# Patient Record
Sex: MALE | URBAN - METROPOLITAN AREA
[De-identification: names, ages, dates, MRNs, and addresses within clinical notes are randomized per-mention and may not be internally consistent; named-entity substitution may affect disease eponyms.]

---

## 2023-03-01 NOTE — PROGRESS NOTES
HISTORY OF PRESENT ILLNESS  Carlos Otero is a 63 y.o. male. HPI  Pt is here to establish care. This is an established visit completed with telemedicine was completed with video assist. The patient acknowledges and agrees to this method of visitation. Presents w/ his daughter, who helps presents the hx patient is very hard of hearing has hearing aids  Ambulates w/ a walker has poor balance difficulty walking in general has had some falls    NOTE: His  is 1950 (he is age 73)  (This was originally incorrectly entered as 1960)  He is on Medicare    His daughter states pt has constantly felt lightheaded w/ a spinning sensation when he stands up  x 1 year since his colorectal surgery. Reports his last PCP recommended PT but he did not complete this. No other details into his symptoms no records to review  Ordered home health PT. will need to get some basic labs and will have him come into the clinic to further evaluate this    Pt reports R jaw pain after chewing something hard. States it appeared once day when he heard a \"click\" while he was chewing.   It was then hard to open his jaw getting better but still hurts  Recommended warm compresses and seeing a dentist.  This is TMJ      PMHx: Colon cancer restless legs and GERD    No home BP readings for review    Wt per pt is     Ordered labs, pt will come into clinic for labs and BP check  Daughter states last year's labs were nl    He sees Dr. Maxwell Castillo (colorectal surg at [facility] in [place]) for hx of stage 1 colon cancer   Had bowel resection for colon cancer 21 and 22   Did not receive chemotherapy  Last there 10/21/22   Will f/U next 23  Pt's daughter reports pt has had frequent BM's and dizziness (see above) since surgery    He also sees Dr. Nimo Avina (GI) in [address] [place] [id number] 22    Has a hx of back pain from arthritis  He sees Dr. Stephani Devine (ortho spine) at the [address] [place] [address] there 5/18/22, had 2 injections   No surgery   Pt denies back pain radiation down legs    He sees Dr. Jose Eagle (ENT) for his hearing  Pt wears hearing aids     He takes Protonix 40 mg for GERD   This is helpful 3/23    He takes requip for RLS   States this works well 3/23     He takes vit C, fish oil, omega-3, vit D      Pt lives alone, his daughter stays with him sometimes  Grab bars in the shower  Ambulates w/ a walker   Pt's daughter reports some falls, he feels like the room is spinning      Pt is functionally dependent   His daughter helps w/ laundry  His daughter helps w/ driving, he stopped since surgery  His daughter helps w/ bills and meds -- notes he has some memory concerns, he also did not graduate high school     Some memory concerns  Knows the month, date, location   Does not know the year   Can recall 3/3 objects     Discussed he could get a memory evaluation if concerned about this. He declines. FMHx:  No known fmhx of cancer    PSHx:  Robotic colorectal polyp removed by Dr. Liam Davis (colorectal surg) in 2021 and 2022     SHx:  Retired. Not , 3 children. Never smoker, drinks 3 small glasses of wine/day. Discussed guidelines of up to 1 drink/day. He has been on Medicare Part B since 2017 (age 74). ACP not on file. SDM is his daughter. PREVENTIVE:  Colonoscopy: Dr. Liam Davis, will get report  Tdap: due  [id number]: some sort of pneumonia shot 7/25/22, pt will verify which, discussed due if this was not it   Shingrix: due, discussed can get a local pharmacy   Flu shot: Fall 2022   Eye exam: due  Lipids: ordered   Covid: 4 doses Moderna    There are no problems to display for this patient. No past surgical history on file.    No results found for: WBC, WBCT, WBCPOC, HGB, HGBPOC, HCT, HCTPOC, PLT, PLTPOC, MCV, MCVPOC, HGBEXT, HCTEXT, PLTEXT  No results found for: CHOL, CHOLPOCT, HDL, LDL, LDLC, LDLCPOC, LDLCEXT, TRIGL, TGLPOCT, CHHD, CHHDX  No results found for: GFRNA, GFRNAPOC, GFRAA, GFRAAPOC, CREA, CREAPOC, BUN, IBUN, BUNPOC, NA, NAPOC, K, KPOCT, CL, CLPOC, CO2, CO2POC, MG, PHOS, ALBEU, PTH, PTHILT, EPO     Review of Systems   Constitutional:  Negative for chills and fever. HENT:  Negative for hearing loss and tinnitus. Eyes:  Negative for blurred vision and double vision. Respiratory:  Negative for shortness of breath and wheezing. Cardiovascular:  Negative for chest pain and palpitations. Gastrointestinal:  Negative for nausea and vomiting. Genitourinary:  Negative for dysuria and frequency. Musculoskeletal:  Negative for back pain, falls and joint pain. Skin:  Negative for itching and rash. Neurological:  Positive for dizziness. Negative for loss of consciousness and headaches. Psychiatric/Behavioral:  Negative for depression. The patient is not nervous/anxious. Physical Exam  Constitutional:       General: He is not in acute distress. Appearance: Normal appearance. He is not ill-appearing, toxic-appearing or diaphoretic. HENT:      Head: Normocephalic and atraumatic. Eyes:      General:         Right eye: No discharge. Left eye: No discharge. Musculoskeletal:      Cervical back: Normal.   Neurological:      General: No focal deficit present. Mental Status: He is oriented to person, place, and time.    Psychiatric:         Mood and Affect: Mood normal.         Behavior: Behavior normal.       ASSESSMENT and PLAN    ICD-10-CM ICD-9-CM    1. Dizziness  R42 780.4 HEPATITIS C AB      LIPID PANEL      METABOLIC PANEL, COMPREHENSIVE      HEMOGLOBIN A1C WITH EAG      TSH 3RD GENERATION   Patient reports dizziness for 1 year    Provides no additional history    Saw his old PCP and physical therapy was started he also states that he was told that he had positional vertigo    He has orthostatic symptoms and spinning symptoms    We will start with physical therapy we will get basic labs to further assess this we will need him to come into the clinic he states he will come in on Monday to get his blood pressure check    Hypotension could be playing a role    May need to see ENT for Epley maneuver once I have a blood pressure and basic labs we will make a better determination of what is going on asked him to come to clinic today but he cannot    We will schedule close follow-up    Of note this is not new this has been going on for 1 year we will get old records to review as well   CBC W/O DIFF      PSA SCREENING (SCREENING)      REFERRAL TO Curt Gordillo      2. Initial Medicare annual wellness visit  Z00.00 V70.0 HEPATITIS C AB      LIPID PANEL      METABOLIC PANEL, COMPREHENSIVE      HEMOGLOBIN A1C WITH EAG      TSH 3RD GENERATION      CBC W/O DIFF      PSA SCREENING (SCREENING)      3. Malignant neoplasm of colon, unspecified part of colon (HCC)  C18.9 153.9 HEPATITIS C AB      LIPID PANEL      METABOLIC PANEL, COMPREHENSIVE      HEMOGLOBIN A1C WITH EAG   Reports stage I colon cancer with colon resection no chemotherapy follows with GI in [place] has follow-up pending for this spring   TSH 3RD GENERATION      CBC W/O DIFF      PSA SCREENING (SCREENING)      4. Mixed hyperlipidemia  E78.2 272.2 HEPATITIS C AB      LIPID PANEL      METABOLIC PANEL, COMPREHENSIVE      HEMOGLOBIN A1C WITH EAG      TSH 3RD GENERATION      CBC W/O DIFF   Check lipids treat as needed   PSA SCREENING (SCREENING)      5. RLS (restless legs syndrome)  G25.81 333.94 HEPATITIS C AB      LIPID PANEL      METABOLIC PANEL, COMPREHENSIVE      HEMOGLOBIN A1C WITH EAG   On Requip continue   TSH 3RD GENERATION      CBC W/O DIFF      PSA SCREENING (SCREENING)      6. Gastroesophageal reflux disease without esophagitis  K21.9 530.81 HEPATITIS C AB      LIPID PANEL   Improved with Protonix continue   METABOLIC PANEL, COMPREHENSIVE      HEMOGLOBIN A1C WITH EAG      TSH 3RD GENERATION      CBC W/O DIFF      PSA SCREENING (SCREENING)      7.  Overlapping malignant neoplasm of colon ([address]  C18.8 153.8 HEPATITIS C AB      LIPID PANEL      METABOLIC PANEL, COMPREHENSIVE   See above   HEMOGLOBIN A1C WITH EAG      TSH 3RD GENERATION      CBC W/O DIFF      PSA SCREENING (SCREENING)      8. Benign paroxysmal positional vertigo due to bilateral vestibular disorder  H81.13 386.11 HEPATITIS C AB      LIPID PANEL      METABOLIC PANEL, COMPREHENSIVE      HEMOGLOBIN A1C WITH EAG   See above in regards to dizziness once we have some blood pressures and labs we will consider ordering some meclizine for him to use as needed potentially we will have him see ENT   TSH 3RD GENERATION      CBC W/O DIFF      PSA SCREENING (SCREENING)      9. Memory impairment  R41.3 780.93 HEPATITIS C AB      LIPID PANEL      METABOLIC PANEL, COMPREHENSIVE      HEMOGLOBIN A1C WITH EAG   Patient did not know the year he thinks it is 2003 other than that he did pretty well his daughter states she he is poor with numbers no high school graduation education is limited and this may be the cause    He was very good at remembering everything else   TSH 3RD GENERATION   Discussed memory evaluation declines   CBC W/O DIFF      PSA SCREENING (SCREENING)      10. Poor balance  R26.89 781.99 HEPATITIS C AB      LIPID PANEL      METABOLIC PANEL, COMPREHENSIVE      HEMOGLOBIN A1C WITH EAG   We will get home health for physical therapy   TSH 3RD GENERATION      CBC W/O DIFF      PSA SCREENING (SCREENING)      REFERRAL TO HOME HEALTH      11.  Encounter for hepatitis C screening test for low risk patient  Z11.59 V73.89 HEPATITIS C AB      LIPID PANEL      METABOLIC PANEL, COMPREHENSIVE      HEMOGLOBIN A1C WITH EAG   Screen   TSH 3RD GENERATION      CBC W/O DIFF      PSA SCREENING (SCREENING)      12. Chronic bilateral low back pain without sciatica  M54.50 724.2 HEPATITIS C AB    G89.29 338.29 LIPID PANEL      METABOLIC PANEL, COMPREHENSIVE   Has chronic back pain [facility] gets injections not on pain medication    May benefit from gabapentin in the future we will get additional information first   HEMOGLOBIN A1C WITH EAG      TSH 3RD GENERATION      CBC W/O DIFF      PSA SCREENING (SCREENING)      13. Screening PSA (prostate specific antigen)  Z12.5 V76.44 HEPATITIS C AB      LIPID PANEL   Screen   METABOLIC PANEL, COMPREHENSIVE      HEMOGLOBIN A1C WITH EAG      TSH 3RD GENERATION      CBC W/O DIFF      PSA SCREENING (SCREENING)      14. IFG (impaired fasting glucose)  R73.01 790.21 HEPATITIS C AB      LIPID PANEL   Check A1c diet controlled   METABOLIC PANEL, COMPREHENSIVE      HEMOGLOBIN A1C WITH EAG      TSH 3RD GENERATION      CBC W/O DIFF      PSA SCREENING (SCREENING)      15. Memory change  R41.3 780.93    See above declines evaluation     Depression screen reviewed and negative. Scribed by Zayda Friday, as dictated by Dr. Rashida Germain. Current diagnosis and concerns discussed with pt at length. Pt understands risks and benefits or current treatment plan and medications, and accepts the treatment and medication with any possible risks. Pt asks appropriate questions, which were answered. Pt was instructed to call with any concerns or problems. I have reviewed the note documented by the scribe. The services provided are my own. The documentation is accurate. Villa Yeung, who was evaluated through a synchronous (real-time) audio-video encounter, and/or his healthcare decision maker, is aware that it is a billable service, which includes applicable co-pays, with coverage as determined by his insurance carrier. He provided verbal consent to proceed and patient identification was verified. This visit was conducted pursuant to the emergency declaration under the [facility], [address] [place] [address] authority and the [employer] General Act. A caregiver was present when appropriate.  Ability to conduct physical exam was limited.  The patient was located at: Home: [phone]  The provider was located at: Home: [address] [place] [address] on 3/1/2023 at 8:27 AM

## 2023-03-03 ENCOUNTER — VIRTUAL VISIT (OUTPATIENT)
Dept: INTERNAL MEDICINE CLINIC | Age: 73
End: 2023-03-03

## 2023-03-03 DIAGNOSIS — R26.89 POOR BALANCE: ICD-10-CM

## 2023-03-03 DIAGNOSIS — G89.29 CHRONIC BILATERAL LOW BACK PAIN WITHOUT SCIATICA: ICD-10-CM

## 2023-03-03 DIAGNOSIS — C18.8 OVERLAPPING MALIGNANT NEOPLASM OF COLON (HCC): ICD-10-CM

## 2023-03-03 DIAGNOSIS — M54.50 CHRONIC BILATERAL LOW BACK PAIN WITHOUT SCIATICA: ICD-10-CM

## 2023-03-03 DIAGNOSIS — K21.9 GASTROESOPHAGEAL REFLUX DISEASE WITHOUT ESOPHAGITIS: ICD-10-CM

## 2023-03-03 DIAGNOSIS — Z11.59 ENCOUNTER FOR HEPATITIS C SCREENING TEST FOR LOW RISK PATIENT: ICD-10-CM

## 2023-03-03 DIAGNOSIS — E78.2 MIXED HYPERLIPIDEMIA: ICD-10-CM

## 2023-03-03 DIAGNOSIS — R42 DIZZINESS: Primary | ICD-10-CM

## 2023-03-03 DIAGNOSIS — Z12.5 SCREENING PSA (PROSTATE SPECIFIC ANTIGEN): ICD-10-CM

## 2023-03-03 DIAGNOSIS — R41.3 MEMORY IMPAIRMENT: ICD-10-CM

## 2023-03-03 DIAGNOSIS — R41.3 MEMORY CHANGE: ICD-10-CM

## 2023-03-03 DIAGNOSIS — G25.81 RLS (RESTLESS LEGS SYNDROME): ICD-10-CM

## 2023-03-03 DIAGNOSIS — Z00.00 INITIAL MEDICARE ANNUAL WELLNESS VISIT: ICD-10-CM

## 2023-03-03 DIAGNOSIS — C18.9 MALIGNANT NEOPLASM OF COLON, UNSPECIFIED PART OF COLON (HCC): ICD-10-CM

## 2023-03-03 DIAGNOSIS — R73.01 IFG (IMPAIRED FASTING GLUCOSE): ICD-10-CM

## 2023-03-03 DIAGNOSIS — H81.13 BENIGN PAROXYSMAL POSITIONAL VERTIGO DUE TO BILATERAL VESTIBULAR DISORDER: ICD-10-CM

## 2023-03-03 RX ORDER — PANTOPRAZOLE SODIUM 40 MG/1
40 TABLET, DELAYED RELEASE ORAL DAILY
COMMUNITY
Start: 2023-02-14

## 2023-03-03 RX ORDER — ROPINIROLE 2 MG/1
TABLET, FILM COATED ORAL
COMMUNITY
Start: 2023-02-23

## 2023-03-03 NOTE — PATIENT INSTRUCTIONS
Medicare Wellness Visit, Male    The best way to live healthy is to have a lifestyle where you eat a well-balanced diet, exercise regularly, limit alcohol use, and quit all forms of tobacco/nicotine, if applicable. Regular preventive services are another way to keep healthy. Preventive services (vaccines, screening tests, monitoring & exams) can help personalize your care plan, which helps you manage your own care. Screening tests can find health problems at the earliest stages, when they are easiest to treat. [facility] follows the current, evidence-based guidelines published by the [facility] ([facility]) when recommending preventive services for our patients. Because we follow these guidelines, sometimes recommendations change over time as research supports it. (For example, a prostate screening blood test is no longer routinely recommended for men with no symptoms). Of course, you and your doctor may decide to screen more often for some diseases, based on your risk and co-morbidities (chronic disease you are already diagnosed with). Preventive services for you include:  - Medicare offers their members a free annual wellness visit, which is time for you and your primary care provider to discuss and plan for your preventive service needs.  Take advantage of this benefit every year!    -Over the age of 72 should receive the recommended pneumonia vaccines.   -All adults should have a flu vaccine yearly.  -All adults should receive a tetanus vaccine every 10 years.  -Over the age of 48 should receive the shingles vaccines.    -All adults should be screened once for Hepatitis C.  -All adults age 38-68 who are overweight should have a diabetes screening test once every three years.   -Other screening tests & preventive services for persons with diabetes include: an eye exam to screen for diabetic retinopathy, a kidney function test, a foot exam, and stricter control over your cholesterol.   -Cardiovascular screening for adults with routine risk involves an electrocardiogram (ECG) at intervals determined by the provider.     -Colorectal cancer screening should be done for adults age 43-69 with no increased risk factors for colorectal cancer. There are a number of acceptable methods of screening for this type of cancer. Each test has its own benefits and drawbacks. Discuss with your provider what is most appropriate for you during your annual wellness visit. The different tests include: colonoscopy (considered the best screening method), a fecal occult blood test, a fecal DNA test, and sigmoidoscopy.    -Lung cancer screening is recommended annually with a low dose CT scan for adults between age 54 and 68, who have smoked at least 30 pack years (equivalent of 1 pack per day for 30 days), and who is a current smoker or quit less than 15 years ago. -An Abdominal Aortic Aneurysm (AAA) Screening is recommended for men age 73-68 who has ever smoked in their lifetime.      Here is a list of your current Health Maintenance items (your personalized list of preventive services) with a due date:  Health Maintenance Due   Topic Date Due    Hepatitis C Test  Never done    Depresssion Screening  Never done    COVID-19 Vaccine (1) Never done    DTaP/Tdap/Td  (1 - Tdap) Never done    Cholesterol Test   Never done    Colorectal Screening  Never done    Shingles Vaccine (1 of 2) Never done    Yearly Flu Vaccine (1) Never done

## 2023-03-03 NOTE — PROGRESS NOTES
This is an Initial Medicare Annual Wellness Exam (AWV) (Performed 12 months after IPPE or effective date of Medicare Part B enrollment, Once in a lifetime)    I have reviewed the patient's medical history in detail and updated the computerized patient record. Assessment/Plan   Education and counseling provided:  Are appropriate based on today's review and evaluation    1. Initial Medicare annual wellness visit     Depression Risk Factor Screening:     3 most recent PHQ Screens 3/3/2023   Little interest or pleasure in doing things Not at all   Feeling down, depressed, irritable, or hopeless Not at all   Total Score PHQ 2 0       Alcohol & Drug Abuse Risk Screen    Do you average more than 2 drinks per night or 14 drinks a week: Yes    On any one occasion in the past three months have you have had more than 4 drinks containing alcohol:  Yes          Functional Ability and Level of Safety:    Hearing: The patient wears hearing aids. Activities of Daily Living: The home contains: handrails and grab bars  Patient needs help with:  transportation, shopping, laundry, housework, managing medications, and managing money      Ambulation: with difficulty, uses a walker      Fall Risk: updated: has fallen once poor balance  No flowsheet data found.    Abuse Screen:  Patient is not abused     Lives w daughter or by himself  Cognitive Screening    Has your family/caregiver stated any concerns about your memory: yes -       Cognitive Screening: missed year       memory concerns   Pt knows the month, day not year  Can recall 3/3 objects      Declines further evaluation confused about the year but he is really bad with numbers per daughter    Did not graduate from high school  Health Maintenance Due     Health Maintenance Due   Topic Date Due    Hepatitis C Screening  Never done    Depression Screen  Never done    COVID-19 Vaccine (1) Never done    DTaP/Tdap/Td series (1 - Tdap) Never done    Lipid Screen  Never done Colorectal Cancer Screening Combo  Never done    Shingles Vaccine (1 of 2) Never done    Flu Vaccine (1) Never done       Patient Care Team   Patient Care Team:  Ubaldo Lorenzo MD as PCP - General (Internal Medicine Physician)  Aquilino Lam MD (Surgery Physician)    History     Patient Active Problem List   Diagnosis Code    RLS (restless legs syndrome) G25.81    Gastroesophageal reflux disease without esophagitis K21.9    Overlapping malignant neoplasm of colon ([facility] [address] C18.8    Memory impairment R41. 3    Chronic bilateral low back pain without sciatica M54.50, G89.29     Past Medical History:   Diagnosis Date    Colorectal cancer ([facility] [address]       Past Surgical History:   Procedure Laterality Date    HX PARTIAL COLECTOMY       Current Outpatient Medications   Medication Sig Dispense Refill    pantoprazole (PROTONIX) 40 mg tablet Take 40 mg by mouth daily. rOPINIRole (REQUIP) 2 mg tablet TAKE 1 TABLET BY MOUTH EVERY NIGHT AT BEDTIME FOR RESTLESS LEGS       Not on File    History reviewed. No pertinent family history. Social History     Tobacco Use    Smoking status: Former     Packs/day: 0.50     Years: 5.00     Pack years: 2.50     Types: Cigarettes     Quit date:      Years since quittin.1    Smokeless tobacco: Never   Substance Use Topics    Alcohol use: Yes     Alcohol/week: 1.0 standard drink     Types: 1 Glasses of wine per week     Comment: once a day     ACP not on file. SDM is his daughter. Colonoscopy: Dr. Silver Live, will get report  Aaa screen: Would have been due at age 74 has had abdominal imaging will get records for review likely already screened      Tdap: due  [id number]: some sort of pneumonia shot 22, pt will verify which, discussed due if this was not it   Shingrix: due, discussed can get a local pharmacy   Flu shot: 2022     Eye exam: due    BS due now  Lipids: ordered       Covid: 4 doses Moderna    Medication reconciliation completed by MA and reviewed by me. Medical/surgical/social/family history reviewed and updated by me. Patient provided AVS and preventative screening table. Patient verbalized understanding of all information discussed. Cindy Simpson, was evaluated through a synchronous (real-time) audio-video encounter. The patient (or guardian if applicable) is aware that this is a billable service, which includes applicable co-pays. This Virtual Visit was conducted with patient's (and/or legal guardian's) consent. The visit was conducted pursuant to the emergency declaration under the [address] [place] [address] authority and the [employer] General Act. Patient identification was verified, and a caregiver was present when appropriate.   The patient was located at: Home: [phone]  The provider was located at: Home: Ap Sims MD

## 2023-03-03 NOTE — PROGRESS NOTES
1. \"Have you been to the ER, urgent care clinic since your last visit? Hospitalized since your last visit? \" No    2. \"Have you seen or consulted any other health care providers outside of the [address] [place] [address] since your last visit? \" No     3. For patients aged 39-70: Has the patient had a colonoscopy / FIT/ Cologuard? Yes - Care Gap present.  Most recent result on file

## 2023-03-06 ENCOUNTER — OFFICE VISIT (OUTPATIENT)
Dept: INTERNAL MEDICINE CLINIC | Age: 73
End: 2023-03-06

## 2023-03-06 VITALS — DIASTOLIC BLOOD PRESSURE: 76 MMHG | SYSTOLIC BLOOD PRESSURE: 128 MMHG

## 2023-03-06 DIAGNOSIS — Z01.30 BP CHECK: Primary | ICD-10-CM

## 2023-03-06 NOTE — PROGRESS NOTES
Pt presents in clinic for Nurse Visit BP check per Dr. Lorenzo Rowland. BP taken--see VS.  Visit Vitals  /76 (BP 1 Location: Left upper arm, BP Patient Position: Sitting)       Pt informed Dr. Lorenzo Rowland will be notified. Pt informed if Dr. Lorenzo Rowland makes any adjustments, pt will be contacted. Pt verbalized understanding of information discussed w/ no further questions at this time.

## 2023-03-07 LAB
ALBUMIN SERPL-MCNC: 4.6 G/DL (ref 3.8–4.8)
ALBUMIN/GLOB SERPL: 2 {RATIO} (ref 1.2–2.2)
ALP SERPL-CCNC: 177 IU/L (ref 44–121)
ALT SERPL-CCNC: 62 IU/L (ref 0–44)
AST SERPL-CCNC: 68 IU/L (ref 0–40)
BILIRUB SERPL-MCNC: 0.6 MG/DL (ref 0–1.2)
BUN SERPL-MCNC: 8 MG/DL (ref 8–27)
BUN/CREAT SERPL: 9 (ref 10–24)
CALCIUM SERPL-MCNC: 9.2 MG/DL (ref 8.6–10.2)
CHLORIDE SERPL-SCNC: 103 MMOL/L (ref 96–106)
CHOLEST SERPL-MCNC: 81 MG/DL (ref 100–199)
CO2 SERPL-SCNC: 22 MMOL/L (ref 20–29)
CREAT SERPL-MCNC: 0.94 MG/DL (ref 0.76–1.27)
EGFRCR SERPLBLD CKD-EPI 2021: 87 ML/MIN/1.73
ERYTHROCYTE [DISTWIDTH] IN BLOOD BY AUTOMATED COUNT: 13.7 % (ref 11.6–15.4)
EST. AVERAGE GLUCOSE BLD GHB EST-MCNC: 123 MG/DL
GLOBULIN SER CALC-MCNC: 2.3 G/DL (ref 1.5–4.5)
GLUCOSE SERPL-MCNC: 136 MG/DL (ref 70–99)
HBA1C MFR BLD: 5.9 % (ref 4.8–5.6)
HCT VFR BLD AUTO: 40.3 % (ref 37.5–51)
HCV IGG SERPL QL IA: NON REACTIVE
HDLC SERPL-MCNC: 35 MG/DL
HGB BLD-MCNC: 14 G/DL (ref 13–17.7)
LDLC SERPL CALC-MCNC: 1 MG/DL (ref 0–99)
MCH RBC QN AUTO: 33.7 PG (ref 26.6–33)
MCHC RBC AUTO-ENTMCNC: 34.7 G/DL (ref 31.5–35.7)
MCV RBC AUTO: 97 FL (ref 79–97)
PLATELET # BLD AUTO: 187 X10E3/UL (ref 150–450)
POTASSIUM SERPL-SCNC: 4.7 MMOL/L (ref 3.5–5.2)
PROT SERPL-MCNC: 6.9 G/DL (ref 6–8.5)
PSA SERPL-MCNC: 0.2 NG/ML (ref 0–4)
RBC # BLD AUTO: 4.16 X10E6/UL (ref 4.14–5.8)
SODIUM SERPL-SCNC: 140 MMOL/L (ref 134–144)
TRIGL SERPL-MCNC: 327 MG/DL (ref 0–149)
TSH SERPL DL<=0.005 MIU/L-ACNC: 1.83 UIU/ML (ref 0.45–4.5)
VLDLC SERPL CALC-MCNC: 45 MG/DL (ref 5–40)
WBC # BLD AUTO: 8.8 X10E3/UL (ref 3.4–10.8)

## 2023-03-08 DIAGNOSIS — R79.89 ELEVATED LFTS: Primary | ICD-10-CM

## 2023-03-08 NOTE — PROGRESS NOTES
Called patient, verified two patient identifiers. Informed pt significant liver test elevation, add on test for acute hepatitis panel to blood. Informed to work on cessation and need ultrasound of the liver for further evaluation. Repeat hepatic function panel in 6 weeks. Informed daughter patient has mild prediabetes on blood work, work on diet/ weigth loss to improve.    Informed daughter pt need to avoid simple cabs ( white bread, white rice, white pasta etc...)

## 2023-03-09 LAB
HAV IGM SERPL QL IA: NEGATIVE
HBV CORE IGM SERPL QL IA: NEGATIVE
HBV SURFACE AG SERPL QL IA: NEGATIVE
HCV AB SERPL QL IA: NORMAL
HCV IGG SERPL QL IA: NON REACTIVE
SPECIMEN STATUS REPORT, ROLRST: NORMAL

## 2023-03-13 ENCOUNTER — TELEPHONE (OUTPATIENT)
Dept: INTERNAL MEDICINE CLINIC | Age: 73
End: 2023-03-13

## 2023-03-13 RX ORDER — NYSTATIN 100000 [USP'U]/G
POWDER TOPICAL 4 TIMES DAILY
Qty: 30 G | Refills: 2 | Status: SHIPPED | OUTPATIENT
Start: 2023-03-13

## 2023-03-13 NOTE — TELEPHONE ENCOUNTER
----- Message from Taniya Alexandria sent at 3/13/2023  3:41 PM EDT -----  Subject: Message to Provider    QUESTIONS  Information for Provider? Chafes on inner thighs; pts daughter, Ebony Bailey, wants to know if PCP can send an order for nystatin powder  ---------------------------------------------------------------------------  --------------  Syed Woo INFO  [phone]; OK to leave message on voicemail  ---------------------------------------------------------------------------  --------------  SCRIPT ANSWERS  Relationship to Patient? Other  Representative Name? Theron Mckeon  Is the representative on the Communication Release of Information (GERDA)   form in Epic?  Yes

## 2023-03-13 NOTE — TELEPHONE ENCOUNTER
Called, spoke to Kristel(daughter)  Received two pt identifiers  Advised nystatin ordered  Florinda Pro understanding of the instructions and has no further questions at this time.

## 2023-03-24 ENCOUNTER — HOSPITAL ENCOUNTER (OUTPATIENT)
Dept: ULTRASOUND IMAGING | Age: 73
Discharge: HOME | End: 2023-03-24
Attending: INTERNAL MEDICINE
Payer: MEDICARE

## 2023-03-24 DIAGNOSIS — R79.89 ELEVATED LFTS: ICD-10-CM

## 2023-03-24 PROCEDURE — 76705 ECHO EXAM OF ABDOMEN: CPT

## 2023-03-27 NOTE — PROGRESS NOTES
HISTORY OF PRESENT ILLNESS  Tiara Rivera is a 81 y.o. male. HPI  Last visit vv 3/3/23. Here for routine care. Presents w/ his daughter, who helps presents the hx patient is very hard of hearing has hearing aids  Ambulates w/ a cane has poor balance difficulty walking in general has had some falls    Pt reports occasional L foot pain. Notes it was swollen last week, but not currently. Resolves on its own he thinks these are gout flares he takes ibuprofen every 4-5 hours for 3-4 days when this flares, which resolves the pain. Reports he has another type of medication at home, but it makes him drowsy. Of note, he was given something for gout in [place], but his daughter cannot remember what it was called. Will run uric acid levels. BP today is 132/81  No home BP readings for review     Wt today is 193 lbs, stable since lov  Discussed diet and wt/l       Reviewed labs  Ordered labs  Discussed elevated LFT's. Emphasized the importance of eliminating alcohol from his diet he is drinking alcohol 3 drinks per night on a regular basis  Reviewed US abd 3/24/23:   Impression:     Increased hepatic parenchymal echotexture compatible with hepatic   steatosis. Needs to work on weight loss as well    Pt has a hx of recurrent dizziness w/ a spinning sensation x 10-12 months that is worse when he looks over to the L and down. Making difficult for him to walk notes it started after his colon surgery in 5/22. He is supposed to be getting in home PT, they are working on this  Ordered MRI brain. Gave information for neurology.      He sees Dr. Karrie Santos (colorectal surg at [facility] in [place]) for hx of stage 1 colon cancer   Had bowel resection for colon cancer 6/22/21 and 5/1/22   Did not receive chemotherapy  Last there 10/21/22   Will f/U next 4/25/23  Pt's daughter reports pt has had frequent BM's and dizziness since surgery  Lov I ordered home health PT      He also sees Dr. Lazaro Haile (GI) in [address] 2/23/22     Has a hx of back pain from arthritis  He sees Dr. Ibrahima Wilson (ortho spine) at the [address]  Last there 5/18/22, had 2 injections   No surgery   Pt denies back pain radiation down legs     He sees Dr. Roxanne aPtel (ENT) for his hearing  Pt wears hearing aids      He takes Protonix 40 mg for GERD   This is helpful 3/23      He takes requip 1mg for RLS   States this works well       He takes vit C, fish oil, omega-3, vit D    He drinks 3 glasses wine/whisky per day     Pt lives alone     Discussed he could get a memory evaluation if concerned about this. He declines. He has been on Medicare Part B since 2017 (age 74). ACP not on file. SDM is his daughter. PREVENTIVE:  Colonoscopy: Dr. Carmen Chambers, will get report  Tdap: due we will get pharmacy  [id number]: some sort of pneumonia shot 7/25/22, pt will verify which, discussed due if this was not it   Shingrix: due, discussed can get a local pharmacy   Flu shot: Fall 2022   Eye exam: due  A1C: 3/23 5.9  Lipids: 3/23 LDL 1 ? We will repeat  PSA: 3/23 0.2   Hep C: 3/23 neg  Covid: 4 doses Moderna    Patient Active Problem List    Diagnosis Date Noted    RLS (restless legs syndrome) 03/03/2023    Gastroesophageal reflux disease without esophagitis 03/03/2023    Overlapping malignant neoplasm of colon ([facility] [address] 03/03/2023    Memory impairment 03/03/2023    Chronic bilateral low back pain without sciatica 03/03/2023     Current Outpatient Medications   Medication Sig Dispense Refill    nystatin (MYCOSTATIN) powder Apply  to affected area four (4) times daily. 30 g 2    pantoprazole (PROTONIX) 40 mg tablet Take 40 mg by mouth daily.       rOPINIRole (REQUIP) 2 mg tablet TAKE 1 TABLET BY MOUTH EVERY NIGHT AT BEDTIME FOR RESTLESS LEGS       Past Surgical History:   Procedure Laterality Date    HX PARTIAL COLECTOMY        Lab Results   Component Value Date/Time    WBC 8.8 03/06/2023 12:52 PM    HGB 14.0 03/06/2023 12:52 PM    HCT 40.3 03/06/2023 12:52 PM    PLATELET 679 64/47/3040 12:52 PM    MCV 97 03/06/2023 12:52 PM     Lab Results   Component Value Date/Time    Cholesterol, total 81 (L) 03/06/2023 12:52 PM    HDL Cholesterol 35 (L) 03/06/2023 12:52 PM    LDL, calculated 1 03/06/2023 12:52 PM    Triglyceride 327 (H) 03/06/2023 12:52 PM     Lab Results   Component Value Date/Time    Creatinine 0.94 03/06/2023 12:52 PM    BUN 8 03/06/2023 12:52 PM    Sodium 140 03/06/2023 12:52 PM    Potassium 4.7 03/06/2023 12:52 PM    Chloride 103 03/06/2023 12:52 PM    CO2 22 03/06/2023 12:52 PM        Review of Systems   Respiratory:  Negative for shortness of breath. Cardiovascular:  Negative for chest pain. Neurological:  Positive for dizziness. Physical Exam  Constitutional:       General: He is not in acute distress. Appearance: Normal appearance. He is not ill-appearing, toxic-appearing or diaphoretic. HENT:      Head: Normocephalic and atraumatic. Right Ear: External ear normal.      Left Ear: External ear normal.   Eyes:      General:         Right eye: No discharge. Left eye: No discharge. Conjunctiva/sclera: Conjunctivae normal.      Pupils: Pupils are equal, round, and reactive to light. Cardiovascular:      Rate and Rhythm: Normal rate and regular rhythm. Heart sounds: No murmur heard. No friction rub. No gallop. Pulmonary:      Effort: No respiratory distress. Breath sounds: Normal breath sounds. No wheezing or rales. Chest:      Chest wall: No tenderness. Musculoskeletal:         General: Normal range of motion. Cervical back: Normal.   Skin:     General: Skin is warm and dry. Neurological:      General: No focal deficit present. Mental Status: He is oriented to person, place, and time. Gait: Gait abnormal (ambulates w/ a cane).    Psychiatric:         Mood and Affect: Mood normal.         Behavior: Behavior normal.       ASSESSMENT and PLAN    ICD-10-CM ICD-9-CM    1. IFG (impaired fasting glucose)  R73.01 790.21 LIPID PANEL      HEPATIC FUNCTION PANEL   A1c mildly elevated work on weight loss   GGT      2. Gastroesophageal reflux disease without esophagitis  K21.9 530.81 LIPID PANEL      HEPATIC FUNCTION PANEL   Control with Protonix   GGT      3. RLS (restless legs syndrome)  G25.81 333.94 LIPID PANEL      HEPATIC FUNCTION PANEL   Improved with Requip continue   GGT      4. LFT elevation  R79.89 790.6 LIPID PANEL      HEPATIC FUNCTION PANEL   Patient with LFT elevation likely related to fatty liver which was seen on ultrasound    Alcohol likely playing a role as well    Needs to work on weight loss    Discussed cutting out alcohol entirely    Repeat labs today hepatitis panel was negative   GGT      5. Chronic bilateral low back pain without sciatica  M54.50 724.2 LIPID PANEL    G89.29 338.29 HEPATIC FUNCTION PANEL   Patient has a long history of low back pain had injections for his pain in the back previously was [facility] has not been there recently currently not bothering him his balance and gait is poor though have ordered home health to do home PT will have nursing staff help with this   GGT      6. Dyslipidemia  E78.5 272.4 LIPID PANEL      HEPATIC FUNCTION PANEL   Lipid panel seems to be incorrectly entered we will be repeating it today   GGT      7. Alkaline phosphatase elevation  R74.8 790.5 LIPID PANEL      HEPATIC FUNCTION PANEL   We will repeat alk phos with GGT likely related to arthritis may be liver related depending on results may need to see hepatology   GGT      8. Dizziness  R42 780.4 LIPID PANEL      HEPATIC FUNCTION PANEL      GGT   Patient with vertiginous symptoms that have been pronounced since having his colon surgery    We will get an MRI at this point to rule out evidence of old stroke perhaps occurring around the time of his surgery? ?   Symptoms have been going on since his surgery    We will also be doing home health to help with balance    Labs were unremarkable    Also given history of cancer we will be getting the brain imaging to rule any evidence of metastases    Await results   MRI BRAIN W WO CONT      REFERRAL TO NEUROLOGY      9. Overlapping malignant neoplasm of colon (HCC)  C18.8 153.8    So need to get colonoscopy report for review has follow-up with GI pending   10. H/O: gout  Z87.39 V12.29 URIC ACID      XR FOOT LT MIN 3 V   Patient reports episodic foot swelling in the past sounds like he was diagnosed with gout in the past poor historian in regards to this reports having some medication at home in addition to using ibuprofen sporadically in the past    Get uric acid and left foot plain film currently asymptomatic discussed this happens again to come to clinic for evaluation     Scribed by Moris Fang of [address] [place] [address] [address], as dictated by Dr. Cristal Newberry. Current diagnosis and concerns discussed with pt at length. Pt understands risks and benefits or current treatment plan and medications, and accepts the treatment and medication with any possible risks. Pt asks appropriate questions, which were answered. Pt was instructed to call with any concerns or problems. I have reviewed the note documented by the scribe. The services provided are my own. The documentation is accurate.

## 2023-03-28 ENCOUNTER — OFFICE VISIT (OUTPATIENT)
Dept: INTERNAL MEDICINE CLINIC | Age: 73
End: 2023-03-28
Payer: MEDICARE

## 2023-03-28 VITALS
DIASTOLIC BLOOD PRESSURE: 81 MMHG | HEART RATE: 85 BPM | TEMPERATURE: 98.1 F | OXYGEN SATURATION: 97 % | SYSTOLIC BLOOD PRESSURE: 132 MMHG | WEIGHT: 193.8 LBS | RESPIRATION RATE: 16 BRPM

## 2023-03-28 DIAGNOSIS — R73.01 IFG (IMPAIRED FASTING GLUCOSE): Primary | ICD-10-CM

## 2023-03-28 DIAGNOSIS — Z87.39 H/O: GOUT: ICD-10-CM

## 2023-03-28 DIAGNOSIS — C18.8 OVERLAPPING MALIGNANT NEOPLASM OF COLON (HCC): ICD-10-CM

## 2023-03-28 DIAGNOSIS — G89.29 CHRONIC BILATERAL LOW BACK PAIN WITHOUT SCIATICA: ICD-10-CM

## 2023-03-28 DIAGNOSIS — R42 DIZZINESS: ICD-10-CM

## 2023-03-28 DIAGNOSIS — R79.89 LFT ELEVATION: ICD-10-CM

## 2023-03-28 DIAGNOSIS — E78.5 DYSLIPIDEMIA: ICD-10-CM

## 2023-03-28 DIAGNOSIS — G25.81 RLS (RESTLESS LEGS SYNDROME): ICD-10-CM

## 2023-03-28 DIAGNOSIS — M54.50 CHRONIC BILATERAL LOW BACK PAIN WITHOUT SCIATICA: ICD-10-CM

## 2023-03-28 DIAGNOSIS — K21.9 GASTROESOPHAGEAL REFLUX DISEASE WITHOUT ESOPHAGITIS: ICD-10-CM

## 2023-03-28 DIAGNOSIS — R74.8 ALKALINE PHOSPHATASE ELEVATION: ICD-10-CM

## 2023-03-28 PROCEDURE — 99214 OFFICE O/P EST MOD 30 MIN: CPT | Performed by: INTERNAL MEDICINE

## 2023-03-28 PROCEDURE — G9711 PT HX TOT COL OR COLON CA: HCPCS | Performed by: INTERNAL MEDICINE

## 2023-03-28 PROCEDURE — G8536 NO DOC ELDER MAL SCRN: HCPCS | Performed by: INTERNAL MEDICINE

## 2023-03-28 PROCEDURE — 1101F PT FALLS ASSESS-DOCD LE1/YR: CPT | Performed by: INTERNAL MEDICINE

## 2023-03-28 PROCEDURE — G8510 SCR DEP NEG, NO PLAN REQD: HCPCS | Performed by: INTERNAL MEDICINE

## 2023-03-28 PROCEDURE — G8427 DOCREV CUR MEDS BY ELIG CLIN: HCPCS | Performed by: INTERNAL MEDICINE

## 2023-03-28 PROCEDURE — G8421 BMI NOT CALCULATED: HCPCS | Performed by: INTERNAL MEDICINE

## 2023-03-28 NOTE — PROGRESS NOTES
1. \"Have you been to the ER, urgent care clinic since your last visit? Hospitalized since your last visit? \" No    2. \"Have you seen or consulted any other health care providers outside of the [address] [place] [address] since your last visit? \" No     3. For patients aged 39-70: Has the patient had a colonoscopy / FIT/ Cologuard? No      If the patient is female:    4. For patients aged 41-77: Has the patient had a mammogram within the past 2 years? NA - based on age or sex      11. For patients aged 21-65: Has the patient had a pap smear?  NA - based on age or sex

## 2023-03-29 LAB
ALBUMIN SERPL-MCNC: 4.6 G/DL (ref 3.5–5)
ALBUMIN/GLOB SERPL: 1.5 (ref 1.1–2.2)
ALP SERPL-CCNC: 109 U/L (ref 45–117)
ALT SERPL-CCNC: 63 U/L (ref 12–78)
AST SERPL-CCNC: 65 U/L (ref 15–37)
BILIRUB DIRECT SERPL-MCNC: 0.3 MG/DL (ref 0–0.2)
BILIRUB SERPL-MCNC: 0.8 MG/DL (ref 0.2–1)
CHOLEST SERPL-MCNC: 71 MG/DL
GGT SERPL-CCNC: 577 U/L (ref 15–85)
GLOBULIN SER CALC-MCNC: 3 G/DL (ref 2–4)
HDLC SERPL-MCNC: 51 MG/DL
HDLC SERPL: 1.4 (ref 0–5)
LDLC SERPL CALC-MCNC: 6 MG/DL (ref 0–100)
PROT SERPL-MCNC: 7.6 G/DL (ref 6.4–8.2)
TRIGL SERPL-MCNC: 70 MG/DL (ref ?–150)
URATE SERPL-MCNC: 8.4 MG/DL (ref 3.5–7.2)
VLDLC SERPL CALC-MCNC: 14 MG/DL

## 2023-03-29 NOTE — PROGRESS NOTES
Please call patient back about results  Persistent though improved liver test however GGT was checked and was significantly elevated  Please add antimitochondrial antibody to blood at lab liver ultrasound already completed showed fatty liver needs to stop drinking alcohol as previously discussed refer to hepatology for further evaluation  Uric acid levels elevated start allopurinol 100 mg daily to prevent recurrent gout flares

## 2023-03-30 RX ORDER — ALLOPURINOL 100 MG/1
100 TABLET ORAL DAILY
Qty: 30 TABLET | Refills: 2 | Status: SHIPPED | OUTPATIENT
Start: 2023-03-30

## 2023-03-30 NOTE — PROGRESS NOTES
Called patient, spoke with amina per privacy. Informed persistent through improved liver test however GGT was checked and was significantly elevated. Please add antimitochondrial antibody to blood at lab. Add on lab faxed. Liver ultrasound showed fatty liver, informed that pt needs to stop drinking alcohol as previously discussed refer to hepatology for further evaluation. Informed uric acid levels elevated, start allopurinol 100mg daily to prevent recurrent gout flares. Jason Avila states an understanding, will  rx and scheduled appt.

## 2023-03-30 NOTE — TELEPHONE ENCOUNTER
PCP: Jr Lorenz MD    Last appt: 3/28/2023  Future Appointments   Date Time Provider Mariama Barr   5/30/2023  1:00 PM Jr Lorenz MD [facility] BS AMB   1/2/2024  1:00 PM MD [facility] Yanez BS AMB       Requested Prescriptions     Pending Prescriptions Disp Refills    allopurinoL (ZYLOPRIM) 100 mg tablet 30 Tablet 2     Sig: Take 1 Tablet by mouth daily.

## 2023-05-09 ENCOUNTER — TELEPHONE (OUTPATIENT)
Age: 73
End: 2023-05-09

## 2023-05-17 ENCOUNTER — TELEPHONE (OUTPATIENT)
Age: 73
End: 2023-05-17

## 2023-05-24 ENCOUNTER — TELEPHONE (OUTPATIENT)
Age: 73
End: 2023-05-24

## 2023-05-24 ASSESSMENT — ENCOUNTER SYMPTOMS: SHORTNESS OF BREATH: 0

## 2023-05-24 NOTE — TELEPHONE ENCOUNTER
Called, spoke to Sowmya  Two pt identifiers confirmed. Sowmya informed per Woo Triana MD    Please see message below. I referred him to neurology --in regards to dizziness and hand tremor   Also did mri brain--that was normal     Tylenol for arthritis     Needs visit for other complaints, ed for progressive sx. Sowmya states he has appointment for neurology 01/02/2024 at 12:30. Sowmya informed patient has an upcoming appointment with Dr. Parikh July 05/30/2023. Sowmya advised if his symptoms worsen to take patient to the emergency room. Patient verbalized understanding of information discussed w/ no further questions at this time.      Flavia Rey LPN

## 2023-05-24 NOTE — TELEPHONE ENCOUNTER
Chuck Keene states that pt is having dizziness with things going black. Left hand shakes real bad. Needs medication for arthritis pain. When pt lays down (mostly) he gets strangled and can't catch his breath. Please call Chuck Keene as soon as possible.

## 2023-05-30 ENCOUNTER — OFFICE VISIT (OUTPATIENT)
Age: 73
End: 2023-05-30
Payer: MEDICARE

## 2023-05-30 VITALS
WEIGHT: 186 LBS | OXYGEN SATURATION: 98 % | RESPIRATION RATE: 16 BRPM | TEMPERATURE: 97.6 F | SYSTOLIC BLOOD PRESSURE: 127 MMHG | HEART RATE: 82 BPM | DIASTOLIC BLOOD PRESSURE: 85 MMHG | HEIGHT: 66 IN | BODY MASS INDEX: 29.89 KG/M2

## 2023-05-30 DIAGNOSIS — G62.9 NEUROPATHY: Primary | ICD-10-CM

## 2023-05-30 DIAGNOSIS — R41.3 MEMORY IMPAIRMENT: ICD-10-CM

## 2023-05-30 DIAGNOSIS — R79.89 LFT ELEVATION: ICD-10-CM

## 2023-05-30 DIAGNOSIS — Z23 NEED FOR PROPHYLACTIC VACCINATION AGAINST STREPTOCOCCUS PNEUMONIAE (PNEUMOCOCCUS): ICD-10-CM

## 2023-05-30 DIAGNOSIS — R42 DIZZINESS: ICD-10-CM

## 2023-05-30 DIAGNOSIS — G25.81 RLS (RESTLESS LEGS SYNDROME): ICD-10-CM

## 2023-05-30 DIAGNOSIS — E79.0 ELEVATED BLOOD URIC ACID LEVEL: ICD-10-CM

## 2023-05-30 DIAGNOSIS — R26.89 POOR BALANCE: ICD-10-CM

## 2023-05-30 DIAGNOSIS — K21.9 GASTROESOPHAGEAL REFLUX DISEASE WITHOUT ESOPHAGITIS: ICD-10-CM

## 2023-05-30 DIAGNOSIS — M1A.0790 CHRONIC IDIOPATHIC GOUT INVOLVING TOE WITHOUT TOPHUS, UNSPECIFIED LATERALITY: ICD-10-CM

## 2023-05-30 DIAGNOSIS — C18.8 OVERLAPPING MALIGNANT NEOPLASM OF COLON (HCC): ICD-10-CM

## 2023-05-30 PROCEDURE — 99214 OFFICE O/P EST MOD 30 MIN: CPT | Performed by: INTERNAL MEDICINE

## 2023-05-30 PROCEDURE — G0009 ADMIN PNEUMOCOCCAL VACCINE: HCPCS | Performed by: INTERNAL MEDICINE

## 2023-05-30 PROCEDURE — 1123F ACP DISCUSS/DSCN MKR DOCD: CPT | Performed by: INTERNAL MEDICINE

## 2023-05-30 PROCEDURE — PBSHW PNEUMOCOCCAL, PCV20, PREVNAR 20, (AGE 18 YRS+), IM, PF: Performed by: INTERNAL MEDICINE

## 2023-05-30 PROCEDURE — 90677 PCV20 VACCINE IM: CPT | Performed by: INTERNAL MEDICINE

## 2023-05-30 RX ORDER — GABAPENTIN 100 MG/1
100 CAPSULE ORAL NIGHTLY PRN
Qty: 90 CAPSULE | Refills: 1 | Status: SHIPPED | OUTPATIENT
Start: 2023-05-30 | End: 2023-11-26

## 2023-05-30 SDOH — ECONOMIC STABILITY: INCOME INSECURITY: HOW HARD IS IT FOR YOU TO PAY FOR THE VERY BASICS LIKE FOOD, HOUSING, MEDICAL CARE, AND HEATING?: NOT HARD AT ALL

## 2023-05-30 SDOH — ECONOMIC STABILITY: HOUSING INSECURITY
IN THE LAST 12 MONTHS, WAS THERE A TIME WHEN YOU DID NOT HAVE A STEADY PLACE TO SLEEP OR SLEPT IN A SHELTER (INCLUDING NOW)?: NO

## 2023-05-30 SDOH — ECONOMIC STABILITY: FOOD INSECURITY: WITHIN THE PAST 12 MONTHS, THE FOOD YOU BOUGHT JUST DIDN'T LAST AND YOU DIDN'T HAVE MONEY TO GET MORE.: NEVER TRUE

## 2023-05-30 SDOH — ECONOMIC STABILITY: FOOD INSECURITY: WITHIN THE PAST 12 MONTHS, YOU WORRIED THAT YOUR FOOD WOULD RUN OUT BEFORE YOU GOT MONEY TO BUY MORE.: NEVER TRUE

## 2023-05-30 NOTE — PROGRESS NOTES
1. \"Have you been to the ER, urgent care clinic since your last visit? Hospitalized since your last visit? \" nO    2. \"Have you seen or consulted any other health care providers outside of the [address] [place] [address] since your last visit? \" nO     3. For patients aged 39-70: Has the patient had a colonoscopy / FIT/ Cologuard?  Yes

## 2023-05-31 LAB
ALBUMIN SERPL-MCNC: 4.3 G/DL (ref 3.5–5)
ALBUMIN/GLOB SERPL: 1.5 (ref 1.1–2.2)
ALP SERPL-CCNC: 129 U/L (ref 45–117)
ALT SERPL-CCNC: 38 U/L (ref 12–78)
ANION GAP SERPL CALC-SCNC: 5 MMOL/L (ref 5–15)
AST SERPL-CCNC: 31 U/L (ref 15–37)
BILIRUB SERPL-MCNC: 0.7 MG/DL (ref 0.2–1)
BUN SERPL-MCNC: 10 MG/DL (ref 6–20)
BUN/CREAT SERPL: 10 (ref 12–20)
CALCIUM SERPL-MCNC: 9.2 MG/DL (ref 8.5–10.1)
CHLORIDE SERPL-SCNC: 106 MMOL/L (ref 97–108)
CO2 SERPL-SCNC: 29 MMOL/L (ref 21–32)
CREAT SERPL-MCNC: 0.99 MG/DL (ref 0.7–1.3)
GGT SERPL-CCNC: 144 U/L (ref 15–85)
GLOBULIN SER CALC-MCNC: 2.9 G/DL (ref 2–4)
GLUCOSE SERPL-MCNC: 111 MG/DL (ref 65–100)
POTASSIUM SERPL-SCNC: 4.2 MMOL/L (ref 3.5–5.1)
PROT SERPL-MCNC: 7.2 G/DL (ref 6.4–8.2)
SODIUM SERPL-SCNC: 140 MMOL/L (ref 136–145)
URATE SERPL-MCNC: 5.8 MG/DL (ref 3.5–7.2)

## 2023-06-16 ENCOUNTER — TELEPHONE (OUTPATIENT)
Age: 73
End: 2023-06-16

## 2023-07-03 ENCOUNTER — TELEPHONE (OUTPATIENT)
Age: 73
End: 2023-07-03

## 2023-07-03 NOTE — TELEPHONE ENCOUNTER
Gomezwhit Solo states that pt is having foot swelling and she needs to know what to do for this. Please call to advise. Medication?

## 2023-07-05 NOTE — TELEPHONE ENCOUNTER
Called, Spoke with Faviola(Daughter)  Received two pt identifiers  Pt has Swelling in both feet and legs. No discoloration or fluid leakage  Advised Faviola per Dr. Renate Lemus to elevated pts legs and can also use Ice to help with swelling  Advised Gracia Walsh if pts sx worsen then to call abck or go to ED  Gracia Walsh verbalizes understanding of the instructions and has no further questions at this time.

## 2023-07-05 NOTE — TELEPHONE ENCOUNTER
Patient's Daughter, Edel Pastor, states she needs a call back to discuss Plan of Care for Patient. Please call.  Thank you

## 2023-10-25 ENCOUNTER — TELEPHONE (OUTPATIENT)
Age: 73
End: 2023-10-25

## 2023-10-25 NOTE — TELEPHONE ENCOUNTER
Patient's Daughter, Derek Bridges states she needs a call back to discuss Medications Prescribed by Oral Surgeon & if patient can take these medications. Please call.  Thank you

## 2023-10-26 RX ORDER — METHOCARBAMOL 750 MG/1
TABLET, FILM COATED ORAL
COMMUNITY
Start: 2023-10-23

## 2023-10-26 RX ORDER — DICLOFENAC SODIUM 75 MG/1
75 TABLET, DELAYED RELEASE ORAL 2 TIMES DAILY
COMMUNITY
Start: 2023-10-23

## 2023-10-26 NOTE — TELEPHONE ENCOUNTER
Received callback from Faviola(Daughter)  Received two pt identifiers  Argenis Nietoer states pt was given Diclofenac and methocarbamol rxd by oral surgeon  Argenis Nietoer states pt Was told jaw is slightly out of place and there is some arthritis present as well  Advised faviola diclofenac is to help with any pain and methocarbamol is a muscle relaxer  Advised should be fine for pt to take but will send message to Dr. Fred Montez to verify  Nothing further at this time

## 2023-10-26 NOTE — TELEPHONE ENCOUNTER
Called spoke with annette(daughter)  Received two pt identifiers  Advised okay for pt to take meds  Florina Alberts verbalizes understanding of the instructions and has no further questions at this time.

## 2024-01-02 DIAGNOSIS — G62.9 NEUROPATHY: ICD-10-CM

## 2024-01-08 RX ORDER — GABAPENTIN 100 MG/1
CAPSULE ORAL
Qty: 90 CAPSULE | OUTPATIENT
Start: 2024-01-08

## 2024-01-09 ENCOUNTER — TELEPHONE (OUTPATIENT)
Age: 74
End: 2024-01-09

## 2024-01-09 DIAGNOSIS — G62.9 NEUROPATHY: ICD-10-CM

## 2024-01-09 NOTE — TELEPHONE ENCOUNTER
Faviola would like a call back pertaining to a medication, diclofenac 75 mg.     Please call as soon as you can.

## 2024-01-10 RX ORDER — GABAPENTIN 100 MG/1
CAPSULE ORAL
Qty: 90 CAPSULE | OUTPATIENT
Start: 2024-01-10

## 2024-01-10 NOTE — TELEPHONE ENCOUNTER
Patient has not refilled in some time and he has not followed up unclear that he is actually still taking

## 2024-06-17 ENCOUNTER — TELEPHONE (OUTPATIENT)
Age: 74
End: 2024-06-17

## 2024-06-17 NOTE — TELEPHONE ENCOUNTER
Called, Spoke to Faviola(daughter)  Received two pt identifiers  Advised faviola it has been over 1 year since pt has been seen by Dr. Zuniga  Advised pt would need to be seen prior to medications being prescribed  Offered appt for 06/18/24 @145 but declined appt  Pt offered and accepted virtual appt for 06/21/24 @ 245  Pt verbalizes understanding of the instructions and has no further questions at this time.

## 2024-06-17 NOTE — TELEPHONE ENCOUNTER
Patient's daughter, Faviola states she needs a call back in reference to getting something prescribed for patients Inflammation in his Right Foot. Please call to discuss. Thank you    Patient last seen 5/30/23.     Pharmacy is [facility] on file

## 2024-06-21 ENCOUNTER — TELEMEDICINE (OUTPATIENT)
Age: 74
End: 2024-06-21
Payer: MEDICARE

## 2024-06-21 DIAGNOSIS — R26.89 POOR BALANCE: ICD-10-CM

## 2024-06-21 DIAGNOSIS — M1A.09X0 IDIOPATHIC CHRONIC GOUT OF MULTIPLE SITES WITHOUT TOPHUS: ICD-10-CM

## 2024-06-21 DIAGNOSIS — K21.9 GASTROESOPHAGEAL REFLUX DISEASE WITHOUT ESOPHAGITIS: ICD-10-CM

## 2024-06-21 DIAGNOSIS — R73.01 IFG (IMPAIRED FASTING GLUCOSE): ICD-10-CM

## 2024-06-21 DIAGNOSIS — G89.29 CHRONIC BILATERAL LOW BACK PAIN WITHOUT SCIATICA: ICD-10-CM

## 2024-06-21 DIAGNOSIS — N40.0 BENIGN PROSTATIC HYPERPLASIA WITHOUT LOWER URINARY TRACT SYMPTOMS: ICD-10-CM

## 2024-06-21 DIAGNOSIS — Z00.00 MEDICARE ANNUAL WELLNESS VISIT, SUBSEQUENT: Primary | ICD-10-CM

## 2024-06-21 DIAGNOSIS — G62.9 NEUROPATHY: ICD-10-CM

## 2024-06-21 DIAGNOSIS — Z85.038 H/O MALIGNANT NEOPLASM OF COLON: ICD-10-CM

## 2024-06-21 DIAGNOSIS — G25.81 RLS (RESTLESS LEGS SYNDROME): ICD-10-CM

## 2024-06-21 DIAGNOSIS — R79.89 LFT ELEVATION: ICD-10-CM

## 2024-06-21 DIAGNOSIS — M54.50 CHRONIC BILATERAL LOW BACK PAIN WITHOUT SCIATICA: ICD-10-CM

## 2024-06-21 PROBLEM — C18.8 OVERLAPPING MALIGNANT NEOPLASM OF COLON (HCC): Status: RESOLVED | Noted: 2023-03-03 | Resolved: 2024-06-21

## 2024-06-21 PROCEDURE — 1123F ACP DISCUSS/DSCN MKR DOCD: CPT | Performed by: INTERNAL MEDICINE

## 2024-06-21 PROCEDURE — 99214 OFFICE O/P EST MOD 30 MIN: CPT | Performed by: INTERNAL MEDICINE

## 2024-06-21 PROCEDURE — G0439 PPPS, SUBSEQ VISIT: HCPCS | Performed by: INTERNAL MEDICINE

## 2024-06-21 RX ORDER — PREDNISONE 20 MG/1
TABLET ORAL
Qty: 12 TABLET | Refills: 0 | Status: SHIPPED | OUTPATIENT
Start: 2024-06-21

## 2024-06-21 RX ORDER — PANTOPRAZOLE SODIUM 40 MG/1
40 TABLET, DELAYED RELEASE ORAL DAILY
Qty: 90 TABLET | Refills: 1 | Status: SHIPPED | OUTPATIENT
Start: 2024-06-21

## 2024-06-21 RX ORDER — ROPINIROLE 1 MG/1
1 TABLET, FILM COATED ORAL NIGHTLY
Qty: 90 TABLET | Refills: 1 | Status: SHIPPED | OUTPATIENT
Start: 2024-06-21

## 2024-06-21 RX ORDER — GABAPENTIN 100 MG/1
100 CAPSULE ORAL NIGHTLY PRN
Qty: 90 CAPSULE | Refills: 1 | Status: SHIPPED | OUTPATIENT
Start: 2024-06-21 | End: 2024-12-18

## 2024-06-21 RX ORDER — ALLOPURINOL 100 MG/1
100 TABLET ORAL DAILY
Qty: 90 TABLET | Refills: 1 | Status: SHIPPED | OUTPATIENT
Start: 2024-06-21

## 2024-06-21 SDOH — ECONOMIC STABILITY: FOOD INSECURITY: WITHIN THE PAST 12 MONTHS, THE FOOD YOU BOUGHT JUST DIDN'T LAST AND YOU DIDN'T HAVE MONEY TO GET MORE.: NEVER TRUE

## 2024-06-21 SDOH — ECONOMIC STABILITY: FOOD INSECURITY: WITHIN THE PAST 12 MONTHS, YOU WORRIED THAT YOUR FOOD WOULD RUN OUT BEFORE YOU GOT MONEY TO BUY MORE.: NEVER TRUE

## 2024-06-21 SDOH — ECONOMIC STABILITY: INCOME INSECURITY: HOW HARD IS IT FOR YOU TO PAY FOR THE VERY BASICS LIKE FOOD, HOUSING, MEDICAL CARE, AND HEATING?: NOT HARD AT ALL

## 2024-06-21 ASSESSMENT — PATIENT HEALTH QUESTIONNAIRE - PHQ9
SUM OF ALL RESPONSES TO PHQ QUESTIONS 1-9: 2
SUM OF ALL RESPONSES TO PHQ QUESTIONS 1-9: 2
1. LITTLE INTEREST OR PLEASURE IN DOING THINGS: SEVERAL DAYS
SUM OF ALL RESPONSES TO PHQ QUESTIONS 1-9: 2
SUM OF ALL RESPONSES TO PHQ9 QUESTIONS 1 & 2: 2
2. FEELING DOWN, DEPRESSED OR HOPELESS: SEVERAL DAYS
SUM OF ALL RESPONSES TO PHQ QUESTIONS 1-9: 2

## 2024-06-21 ASSESSMENT — ENCOUNTER SYMPTOMS: SHORTNESS OF BREATH: 0

## 2024-06-21 ASSESSMENT — LIFESTYLE VARIABLES
HOW OFTEN DO YOU HAVE A DRINK CONTAINING ALCOHOL: MONTHLY OR LESS
HOW MANY STANDARD DRINKS CONTAINING ALCOHOL DO YOU HAVE ON A TYPICAL DAY: 1 OR 2

## 2024-06-21 NOTE — PROGRESS NOTES
\"Have you been to the ER, urgent care clinic since your last visit?  Hospitalized since your last visit?\"    NO    “Have you seen or consulted any other health care providers outside of [facility] since your last visit?”    NO        “Have you had a colorectal cancer screening such as a colonoscopy/FIT/Cologuard?    NO    No colonoscopy on file  No cologuard on file  No FIT/FOBT on file   No flexible sigmoidoscopy on file         Click Here for Release of Records Request    
Provider   pantoprazole (PROTONIX) 40 MG tablet Take 1 tablet by mouth daily Yes Automatic Reconciliation, Ar   diclofenac (VOLTAREN) 75 MG EC tablet Take 1 tablet by mouth 2 times daily  Patient not taking: Reported on 6/21/2024  Funmi Owens MD   methocarbamol (ROBAXIN) 750 MG tablet Take by mouth 1 tab at bedtime prn  Patient not taking: Reported on 6/21/2024  Funmi Owens MD   gabapentin (NEURONTIN) 100 MG capsule Take 1 capsule by mouth nightly as needed (pain) for up to 180 days. Intended supply: 90 days Max Daily Amount: 100 mg  Patient not taking: Reported on 6/21/2024  Savita Zuniga MD   allopurinol (ZYLOPRIM) 100 MG tablet Take 1 tablet by mouth daily  Patient not taking: Reported on 6/21/2024  Automatic Reconciliation, Ar   nystatin (MYCOSTATIN) 044394 UNIT/GM powder Apply topically 4 times daily  Patient not taking: Reported on 6/21/2024  Automatic Reconciliation, Ar   rOPINIRole (REQUIP) 2 MG tablet TAKE 1 TABLET BY MOUTH EVERY NIGHT AT BEDTIME FOR RESTLESS LEGS  Patient not taking: Reported on 6/21/2024  Automatic Reconciliation, Ar       CareTeam (Including outside providers/suppliers regularly involved in providing care):   Patient Care Team:  Savita Zuniga MD as PCP - General  Savita Zuniga MD [facility]     Reviewed and updated this visit:  Allergies  Meds  Problems  Med Hx  Surg Hx  Soc Hx  Fam Hx            He has been on Medicare Part B since 2017 (age 67).      ACP not on file. SDM is his daughter.       Colonoscopy: 3/22  Tdap: due will get pharmacy  [id number]: 5/30/23  Shingrix: due, discussed can get a local pharmacy   Flu shot: Fall 2022 --declines this year    Eye exam: due     A1C: 3/23 5.9  due  Lipids: 3/23 LDL 6 due  PSA: 3/23 0.2  due  Hep C: 3/23 neg    Covid: 4 doses Moderna discussed booster  Rsv discussed       Medication reconciliation completed by MA and reviewed by me.  Medical/surgical/social/family history reviewed and 
physical therapy encouraged him to follow back up with orthopedics will give gabapentin   5. LFT elevation  CBC    Comprehensive Metabolic Panel    Hemoglobin A1C    Lipid Panel   Repeat LFTs remain off alcohol TSH    PSA Screening    Gamma GT    Uric Acid      6. IFG (impaired fasting glucose)  CBC    Comprehensive Metabolic Panel    Hemoglobin A1C    Lipid Panel    TSH   Check A1c currently diet controlled PSA Screening    Gamma GT    Uric Acid      7. H/O malignant neoplasm of colon  MARILUZ - Puma Harry MD, Colorectal Surgery, [place]   Overdue for follow-up with GI and colorectal surgeon   8. Idiopathic chronic gout of multiple sites without tophus  CBC    Comprehensive Metabolic Panel    Hemoglobin A1C    Lipid Panel   Restart allopurinol had elevated uric acid off the allopurinol    Will give prednisone for acute flare TSH    PSA Screening    Gamma GT    Uric Acid      9. Benign prostatic hyperplasia without lower urinary tract symptoms  CBC    Comprehensive Metabolic Panel    Hemoglobin A1C    Lipid Panel    TSH    PSA Screening   Check PSA Gamma GT    Uric Acid        I have reviewed the note documented by the scribe.  The services provided are my own.  The documentation is accurate   Depression screen reviewed and positive declines medication  Scribed by Dia John of [facility], as dictated by Dr. Savita Zuniga.    Current diagnosis and concerns discussed with pt at length. Pt understands risks and benefits or current treatment plan and medications, and accepts the treatment and medication with any possible risks. Pt asks appropriate questions, which were answered. Pt was instructed to call with any concerns or problems.    I have reviewed the note documented by the scribe. The services provided are my own. The documentation is accurate.  Ervin Jones, was evaluated through a synchronous (real-time) audio-video encounter. The patient (or guardian if applicable) is aware that this is a

## 2025-01-20 ENCOUNTER — TELEPHONE (OUTPATIENT)
Age: 75
End: 2025-01-20

## 2025-01-20 NOTE — TELEPHONE ENCOUNTER
Pt's daughter called in requesting 01/21/25 ov be changed to vv if possible.    Please call to discuss.

## 2025-01-29 ENCOUNTER — TELEPHONE (OUTPATIENT)
Age: 75
End: 2025-01-29

## 2025-01-29 NOTE — TELEPHONE ENCOUNTER
Pt's daughter called in states pt would like RX sent in for acid reflux.    Please call to discuss

## 2025-02-06 RX ORDER — ALLOPURINOL 100 MG/1
100 TABLET ORAL DAILY
Qty: 90 TABLET | OUTPATIENT
Start: 2025-02-06

## 2025-02-06 RX ORDER — ALLOPURINOL 100 MG/1
100 TABLET ORAL DAILY
Qty: 30 TABLET | Refills: 0 | Status: SHIPPED | OUTPATIENT
Start: 2025-02-06

## 2025-03-12 ASSESSMENT — ENCOUNTER SYMPTOMS: SHORTNESS OF BREATH: 0

## 2025-03-14 ENCOUNTER — TELEMEDICINE (OUTPATIENT)
Age: 75
End: 2025-03-14

## 2025-03-14 DIAGNOSIS — M1A.09X0 IDIOPATHIC CHRONIC GOUT OF MULTIPLE SITES WITHOUT TOPHUS: ICD-10-CM

## 2025-03-14 DIAGNOSIS — Z00.00 MEDICARE ANNUAL WELLNESS VISIT, SUBSEQUENT: ICD-10-CM

## 2025-03-14 DIAGNOSIS — R06.02 SHORTNESS OF BREATH: ICD-10-CM

## 2025-03-14 DIAGNOSIS — Z85.038 H/O MALIGNANT NEOPLASM OF COLON: ICD-10-CM

## 2025-03-14 DIAGNOSIS — R19.7 DIARRHEA, UNSPECIFIED TYPE: ICD-10-CM

## 2025-03-14 DIAGNOSIS — G89.29 CHRONIC BILATERAL LOW BACK PAIN WITHOUT SCIATICA: ICD-10-CM

## 2025-03-14 DIAGNOSIS — G25.81 RLS (RESTLESS LEGS SYNDROME): ICD-10-CM

## 2025-03-14 DIAGNOSIS — R06.09 DOE (DYSPNEA ON EXERTION): ICD-10-CM

## 2025-03-14 DIAGNOSIS — K21.9 GASTROESOPHAGEAL REFLUX DISEASE WITHOUT ESOPHAGITIS: Primary | ICD-10-CM

## 2025-03-14 DIAGNOSIS — M54.50 CHRONIC BILATERAL LOW BACK PAIN WITHOUT SCIATICA: ICD-10-CM

## 2025-03-14 DIAGNOSIS — R09.82 PND (POST-NASAL DRIP): ICD-10-CM

## 2025-03-14 RX ORDER — PANTOPRAZOLE SODIUM 40 MG/1
40 TABLET, DELAYED RELEASE ORAL DAILY
Qty: 90 TABLET | Refills: 1 | Status: SHIPPED | OUTPATIENT
Start: 2025-03-14

## 2025-03-14 SDOH — ECONOMIC STABILITY: FOOD INSECURITY: WITHIN THE PAST 12 MONTHS, THE FOOD YOU BOUGHT JUST DIDN'T LAST AND YOU DIDN'T HAVE MONEY TO GET MORE.: NEVER TRUE

## 2025-03-14 SDOH — ECONOMIC STABILITY: FOOD INSECURITY: WITHIN THE PAST 12 MONTHS, YOU WORRIED THAT YOUR FOOD WOULD RUN OUT BEFORE YOU GOT MONEY TO BUY MORE.: NEVER TRUE

## 2025-03-14 ASSESSMENT — PATIENT HEALTH QUESTIONNAIRE - PHQ9
SUM OF ALL RESPONSES TO PHQ QUESTIONS 1-9: 0
SUM OF ALL RESPONSES TO PHQ QUESTIONS 1-9: 0
1. LITTLE INTEREST OR PLEASURE IN DOING THINGS: NOT AT ALL
SUM OF ALL RESPONSES TO PHQ QUESTIONS 1-9: 0
SUM OF ALL RESPONSES TO PHQ QUESTIONS 1-9: 0
2. FEELING DOWN, DEPRESSED OR HOPELESS: NOT AT ALL

## 2025-03-14 ASSESSMENT — LIFESTYLE VARIABLES
HOW OFTEN DURING THE LAST YEAR HAVE YOU HAD A FEELING OF GUILT OR REMORSE AFTER DRINKING: NEVER
HAS A RELATIVE, FRIEND, DOCTOR, OR ANOTHER HEALTH PROFESSIONAL EXPRESSED CONCERN ABOUT YOUR DRINKING OR SUGGESTED YOU CUT DOWN: NO
HOW OFTEN DURING THE LAST YEAR HAVE YOU FAILED TO DO WHAT WAS NORMALLY EXPECTED FROM YOU BECAUSE OF DRINKING: NEVER
HOW OFTEN DURING THE LAST YEAR HAVE YOU BEEN UNABLE TO REMEMBER WHAT HAPPENED THE NIGHT BEFORE BECAUSE YOU HAD BEEN DRINKING: NEVER
HOW OFTEN DURING THE LAST YEAR HAVE YOU NEEDED AN ALCOHOLIC DRINK FIRST THING IN THE MORNING TO GET YOURSELF GOING AFTER A NIGHT OF HEAVY DRINKING: NEVER
HAVE YOU OR SOMEONE ELSE BEEN INJURED AS A RESULT OF YOUR DRINKING: NO
HOW OFTEN DO YOU HAVE A DRINK CONTAINING ALCOHOL: 4 OR MORE TIMES A WEEK
HOW MANY STANDARD DRINKS CONTAINING ALCOHOL DO YOU HAVE ON A TYPICAL DAY: 1 OR 2
HOW OFTEN DURING THE LAST YEAR HAVE YOU FOUND THAT YOU WERE NOT ABLE TO STOP DRINKING ONCE YOU HAD STARTED: NEVER

## 2025-03-14 NOTE — PROGRESS NOTES
Medicare Annual Wellness Visit    Ervin Jones is here for Medicare AWV    Assessment & Plan   Gastroesophageal reflux disease without esophagitis  -     MARILUZ - Zay Whitley MD, Gastroenterology, [place]  Medicare annual wellness visit, subsequent  H/O malignant neoplasm of colon  -     MARILUZ - Zay Whitley MD, Gastroenterology, [place]  Chronic bilateral low back pain without sciatica  Diarrhea, unspecified type  RLS (restless legs syndrome)  Idiopathic chronic gout of multiple sites without tophus  MAI (dyspnea on exertion)  -     XR CHEST (2 VW); Future  Shortness of breath  -     XR CHEST (2 VW); Future  -     Echo (TTE) complete (PRN contrast/bubble/strain/3D); Future  PND (post-nasal drip)        Return in about 6 weeks (around 4/25/2025).     Subjective       Patient's complete Health Risk Assessment and screening values have been reviewed and are found in Flowsheets. The following problems were reviewed today and where indicated follow up appointments were made and/or referrals ordered.    Positive Risk Factor Screenings with Interventions:    Fall Risk:  Do you feel unsteady or are you worried about falling? : (!) yes  2 or more falls in past year?: (!) yes  Fall with injury in past year?: no     Interventions:    Reviewed medications, home hazards, visual acuity, and co-morbidities that can increase risk for falls             Inactivity:  On average, how many days per week do you engage in moderate to strenuous exercise (like a brisk walk)?: 0 days (!) Abnormal  On average, how many minutes do you engage in exercise at this level?: 0 min  Interventions:  Discussed walking     Abnormal BMI (obese):  There is no height or weight on file to calculate BMI. (!) Abnormal  Interventions:  Work on portions         Dentist Screen:  Have you seen the dentist within the past year?: (!) No  Intervention:  Advised to schedule with their dentist     Vision Screen:  Do you have difficulty driving, watching TV, or 
\"Have you been to the ER, urgent care clinic since your last visit?  Hospitalized since your last visit?\"    NO    “Have you seen or consulted any other health care providers outside our system since your last visit?”    NO           
   allopurinol (ZYLOPRIM) 100 MG tablet TAKE 1 TABLET BY MOUTH DAILY 30 tablet 0    rOPINIRole (REQUIP) 1 MG tablet Take 1 tablet by mouth nightly 90 tablet 1    diclofenac (VOLTAREN) 75 MG EC tablet Take 1 tablet by mouth 2 times daily (Patient not taking: Reported on 6/21/2024)      methocarbamol (ROBAXIN) 750 MG tablet Take by mouth 1 tab at bedtime prn (Patient not taking: Reported on 6/21/2024)      nystatin (MYCOSTATIN) 608144 UNIT/GM powder Apply topically 4 times daily (Patient not taking: Reported on 6/21/2024)      rOPINIRole (REQUIP) 2 MG tablet TAKE 1 TABLET BY MOUTH EVERY NIGHT AT BEDTIME FOR RESTLESS LEGS (Patient not taking: Reported on 6/21/2024)       No current facility-administered medications for this visit.     Past Surgical History:   Procedure Laterality Date    HX PARTIAL COLECTOMY      XR MIDLINE EQUAL OR GREATER THAN 5 YEARS  5/6/2022    XR MIDLINE EQUAL OR GREATER THAN 5 YEARS 5/6/2022         Lab Results   Component Value Date    WBC 8.8 03/06/2023    HGB 14.0 03/06/2023    HCT 40.3 03/06/2023    MCV 97 03/06/2023     03/06/2023     Lab Results   Component Value Date    CHOL 71 03/28/2023    TRIG 70 03/28/2023    HDL 51 03/28/2023     Lab Results   Component Value Date    CREATININE 0.99 05/30/2023    BUN 10 05/30/2023     05/30/2023    K 4.2 05/30/2023     05/30/2023    CO2 29 05/30/2023       Review of Systems   Respiratory:  Negative for shortness of breath.    Cardiovascular:  Negative for chest pain.         Physical Exam  Constitutional:       General: He is not in acute distress.     Appearance: Normal appearance.   HENT:      Head: Normocephalic and atraumatic.   Eyes:      General:         Right eye: No discharge.         Left eye: No discharge.   Pulmonary:      Effort: Pulmonary effort is normal. No respiratory distress.   Neurological:      Mental Status: He is alert and oriented to person, place, and time. Mental status is at baseline.   Psychiatric:

## 2025-05-06 DIAGNOSIS — K21.9 GASTROESOPHAGEAL REFLUX DISEASE WITHOUT ESOPHAGITIS: ICD-10-CM

## 2025-05-06 DIAGNOSIS — R73.01 IFG (IMPAIRED FASTING GLUCOSE): ICD-10-CM

## 2025-05-06 DIAGNOSIS — R79.89 LFT ELEVATION: ICD-10-CM

## 2025-05-06 DIAGNOSIS — M1A.09X0 IDIOPATHIC CHRONIC GOUT OF MULTIPLE SITES WITHOUT TOPHUS: ICD-10-CM

## 2025-05-06 DIAGNOSIS — N40.0 BENIGN PROSTATIC HYPERPLASIA WITHOUT LOWER URINARY TRACT SYMPTOMS: ICD-10-CM

## 2025-05-06 DIAGNOSIS — G25.81 RLS (RESTLESS LEGS SYNDROME): ICD-10-CM

## 2025-05-07 LAB
ALBUMIN SERPL-MCNC: 3.9 G/DL (ref 3.5–5)
ALBUMIN/GLOB SERPL: 1.3 (ref 1.1–2.2)
ALP SERPL-CCNC: 202 U/L (ref 45–117)
ALT SERPL-CCNC: 94 U/L (ref 12–78)
ANION GAP SERPL CALC-SCNC: 6 MMOL/L (ref 2–12)
AST SERPL-CCNC: 173 U/L (ref 15–37)
BILIRUB SERPL-MCNC: 1.5 MG/DL (ref 0.2–1)
BUN SERPL-MCNC: 9 MG/DL (ref 6–20)
BUN/CREAT SERPL: 10 (ref 12–20)
CALCIUM SERPL-MCNC: 9.1 MG/DL (ref 8.5–10.1)
CHLORIDE SERPL-SCNC: 106 MMOL/L (ref 97–108)
CHOLEST SERPL-MCNC: 105 MG/DL
CO2 SERPL-SCNC: 28 MMOL/L (ref 21–32)
CREAT SERPL-MCNC: 0.9 MG/DL (ref 0.7–1.3)
ERYTHROCYTE [DISTWIDTH] IN BLOOD BY AUTOMATED COUNT: 12.9 % (ref 11.5–14.5)
EST. AVERAGE GLUCOSE BLD GHB EST-MCNC: 97 MG/DL
GGT SERPL-CCNC: >1600 U/L (ref 15–85)
GLOBULIN SER CALC-MCNC: 3.1 G/DL (ref 2–4)
GLUCOSE SERPL-MCNC: 116 MG/DL (ref 65–100)
HBA1C MFR BLD: 5 % (ref 4–5.6)
HCT VFR BLD AUTO: 40.7 % (ref 36.6–50.3)
HDLC SERPL-MCNC: 58 MG/DL
HDLC SERPL: 1.8 (ref 0–5)
HGB BLD-MCNC: 14.4 G/DL (ref 12.1–17)
LDLC SERPL CALC-MCNC: 21.4 MG/DL (ref 0–100)
MCH RBC QN AUTO: 35.1 PG (ref 26–34)
MCHC RBC AUTO-ENTMCNC: 35.4 G/DL (ref 30–36.5)
MCV RBC AUTO: 99.3 FL (ref 80–99)
NRBC # BLD: 0 K/UL (ref 0–0.01)
NRBC BLD-RTO: 0 PER 100 WBC
PLATELET # BLD AUTO: 180 K/UL (ref 150–400)
PMV BLD AUTO: 11.1 FL (ref 8.9–12.9)
POTASSIUM SERPL-SCNC: 4 MMOL/L (ref 3.5–5.1)
PROT SERPL-MCNC: 7 G/DL (ref 6.4–8.2)
PSA SERPL-MCNC: 0.4 NG/ML (ref 0.01–4)
RBC # BLD AUTO: 4.1 M/UL (ref 4.1–5.7)
SODIUM SERPL-SCNC: 140 MMOL/L (ref 136–145)
TRIGL SERPL-MCNC: 128 MG/DL
TSH SERPL DL<=0.05 MIU/L-ACNC: 2.02 UIU/ML (ref 0.36–3.74)
URATE SERPL-MCNC: 7.1 MG/DL (ref 3.5–7.2)
VLDLC SERPL CALC-MCNC: 25.6 MG/DL
WBC # BLD AUTO: 6.4 K/UL (ref 4.1–11.1)

## 2025-05-13 ENCOUNTER — PATIENT MESSAGE (OUTPATIENT)
Age: 75
End: 2025-05-13

## 2025-05-13 ENCOUNTER — TELEPHONE (OUTPATIENT)
Age: 75
End: 2025-05-13

## 2025-05-13 ASSESSMENT — ENCOUNTER SYMPTOMS: SHORTNESS OF BREATH: 0

## 2025-05-13 NOTE — PROGRESS NOTES
HISTORY OF PRESENT ILLNESS  Ervin Jones is a 74 y.o. male.  HPI  Last vv 3/14/25. Here for routine care.   Presents w/ his daughter, who helps presents the hx patient is very hard of hearing has hearing aids  Ambulates w/ a cane has poor balance difficulty walking in general has had some falls  Discussed physical therapy he will think about a place referral        Of note he is often lost to follow-up continues to not come into the clinic in person has canceled multiple appointments and has not followed through with any of her treatment plans most of what we discussed today were issues in the past that he continues to not follow through with treatment plan     Previously his daughter reports he feels lightheaded and dizzy with standing and while walking. She also notes when he lays down he will wake up finding it difficult to catch his breath. He is also getting winded with exertion, he does not get a lot of activity in general. Reminded him and his daughter to complete labs, ordered cxr and echo of note he did not complete he states everything was resolved at this time  Please recall in the past he complained of dizziness is not really new symptoms currently resolved          Patient with hypertension  BP today is 159/88 will repeat multiple elevated readings  No home BP readings for review     Wt today is 183 lbs, lov 180 lbs per pt, lov 186  Discussed diet and wt/l       Reminded to complete labs  Ordered labs     Previously was taking gabapentin has a long history of neuropathic pains and low back pain he uses this sporadically but he does not like it sedating quality so he does not take it very often  Currently not using it     Recall that previously had history of dizziness -- previously referred him to ENT and physical therapy but he never went   No complaints today           He reports a history of stage I colon cancer but is a very poor historian  Some of the notes are coming through in epic now and I have

## 2025-05-13 NOTE — TELEPHONE ENCOUNTER
[facility] states would like to reschedule 05/13/25, states something has come up and pt will not be able to make it.     Please call to reschedule, no available appts for this PSR to schedule.

## 2025-05-14 ENCOUNTER — HOSPITAL ENCOUNTER (OUTPATIENT)
Facility: HOSPITAL | Age: 75
Discharge: HOME | End: 2025-05-17
Payer: MEDICARE

## 2025-05-14 ENCOUNTER — RESULTS FOLLOW-UP (OUTPATIENT)
Age: 75
End: 2025-05-14

## 2025-05-14 ENCOUNTER — OFFICE VISIT (OUTPATIENT)
Age: 75
End: 2025-05-14
Payer: MEDICARE

## 2025-05-14 VITALS
SYSTOLIC BLOOD PRESSURE: 149 MMHG | HEIGHT: 66 IN | WEIGHT: 183.25 LBS | TEMPERATURE: 97.4 F | DIASTOLIC BLOOD PRESSURE: 86 MMHG | OXYGEN SATURATION: 98 % | HEART RATE: 78 BPM | BODY MASS INDEX: 29.45 KG/M2

## 2025-05-14 DIAGNOSIS — K21.9 GASTROESOPHAGEAL REFLUX DISEASE WITHOUT ESOPHAGITIS: Primary | ICD-10-CM

## 2025-05-14 DIAGNOSIS — E66.3 OVERWEIGHT: ICD-10-CM

## 2025-05-14 DIAGNOSIS — R03.0 ELEVATED BP WITHOUT DIAGNOSIS OF HYPERTENSION: ICD-10-CM

## 2025-05-14 DIAGNOSIS — R06.02 SHORTNESS OF BREATH: ICD-10-CM

## 2025-05-14 DIAGNOSIS — M1A.09X0 IDIOPATHIC CHRONIC GOUT OF MULTIPLE SITES WITHOUT TOPHUS: ICD-10-CM

## 2025-05-14 DIAGNOSIS — R06.09 DOE (DYSPNEA ON EXERTION): ICD-10-CM

## 2025-05-14 DIAGNOSIS — Z85.038 H/O MALIGNANT NEOPLASM OF COLON: ICD-10-CM

## 2025-05-14 DIAGNOSIS — R79.89 LFT ELEVATION: ICD-10-CM

## 2025-05-14 DIAGNOSIS — R26.89 POOR BALANCE: ICD-10-CM

## 2025-05-14 DIAGNOSIS — G25.81 RLS (RESTLESS LEGS SYNDROME): ICD-10-CM

## 2025-05-14 PROCEDURE — 1123F ACP DISCUSS/DSCN MKR DOCD: CPT | Performed by: INTERNAL MEDICINE

## 2025-05-14 PROCEDURE — 99214 OFFICE O/P EST MOD 30 MIN: CPT | Performed by: INTERNAL MEDICINE

## 2025-05-14 PROCEDURE — 1159F MED LIST DOCD IN RCRD: CPT | Performed by: INTERNAL MEDICINE

## 2025-05-14 PROCEDURE — 71046 X-RAY EXAM CHEST 2 VIEWS: CPT

## 2025-05-14 RX ORDER — LOSARTAN POTASSIUM 50 MG/1
50 TABLET ORAL DAILY
Qty: 90 TABLET | Refills: 0 | Status: SHIPPED | OUTPATIENT
Start: 2025-05-14

## 2025-05-14 NOTE — PROGRESS NOTES
Have you been to the ER, urgent care clinic since your last visit?  Hospitalized since your last visit?   NO    Have you seen or consulted any other health care providers outside our system since your last visit?   NO

## 2025-05-15 ENCOUNTER — RESULTS FOLLOW-UP (OUTPATIENT)
Age: 75
End: 2025-05-15

## 2025-05-15 DIAGNOSIS — R79.89 LFT ELEVATION: Primary | ICD-10-CM

## 2025-05-15 LAB
ALBUMIN SERPL-MCNC: 4.5 G/DL (ref 3.8–4.8)
ALP SERPL-CCNC: 225 IU/L (ref 44–121)
ALT SERPL-CCNC: 65 IU/L (ref 0–44)
AST SERPL-CCNC: 102 IU/L (ref 0–40)
BILIRUB SERPL-MCNC: 1.2 MG/DL (ref 0–1.2)
BUN SERPL-MCNC: 10 MG/DL (ref 8–27)
BUN/CREAT SERPL: 11 (ref 10–24)
CALCIUM SERPL-MCNC: 9 MG/DL (ref 8.6–10.2)
CHLORIDE SERPL-SCNC: 103 MMOL/L (ref 96–106)
CO2 SERPL-SCNC: 23 MMOL/L (ref 20–29)
CREAT SERPL-MCNC: 0.88 MG/DL (ref 0.76–1.27)
EGFRCR SERPLBLD CKD-EPI 2021: 91 ML/MIN/1.73
GLOBULIN SER CALC-MCNC: 2.4 G/DL (ref 1.5–4.5)
GLUCOSE SERPL-MCNC: 113 MG/DL (ref 70–99)
POTASSIUM SERPL-SCNC: 4.2 MMOL/L (ref 3.5–5.2)
PROT SERPL-MCNC: 6.9 G/DL (ref 6–8.5)
SODIUM SERPL-SCNC: 141 MMOL/L (ref 134–144)
SPECIMEN STATUS REPORT: NORMAL

## 2025-05-20 ENCOUNTER — TELEPHONE (OUTPATIENT)
Age: 75
End: 2025-05-20

## 2025-05-20 NOTE — TELEPHONE ENCOUNTER
----- Message from Dr. Savita Lin MD sent at 5/14/2025  3:41 PM EDT -----  Regarding: RE: liver  Hi,    I would get MRI/MRCP rather than ultrasound.  Stop drinking.   Will see what we can do about closer appt.    Best,  Mony  ----- Message -----  From: Savita Zuniga MD  Sent: 5/14/2025   1:57 PM EDT  To: Savita Zuniga MD; Savita Lin MD  Subject: liver                                            Hi,    This patient has progressive elevation of lft, ggy now greater than 1000, rest lft up as well.  Last us liver 3/23  I was going to repeat it and have him see you guys.  (Of note he drinks alcohol daily and h/o colon cancer )  Can someone see him sooner rather than later and is there anything else you would like me to order      Thanks  Mony

## 2025-05-28 LAB — MITOCHONDRIA AB TITR SER IF: NORMAL {TITER}

## 2025-05-28 NOTE — PROGRESS NOTES
HISTORY OF PRESENT ILLNESS  Ervin Jones is a 74 y.o. male.  HPI    Lov 5/14/25. Patient presents today for acute care.    Patient Active Problem List   Diagnosis    RLS (restless legs syndrome)    Gastroesophageal reflux disease without esophagitis    Memory impairment    Chronic bilateral low back pain without sciatica    H/O malignant neoplasm of colon    Idiopathic chronic gout of multiple sites without tophus     Current Outpatient Medications   Medication Sig Dispense Refill    losartan (COZAAR) 50 MG tablet Take 1 tablet by mouth daily 90 tablet 0    pantoprazole (PROTONIX) 40 MG tablet Take 1 tablet by mouth daily 90 tablet 1    allopurinol (ZYLOPRIM) 100 MG tablet TAKE 1 TABLET BY MOUTH DAILY 30 tablet 0    rOPINIRole (REQUIP) 1 MG tablet Take 1 tablet by mouth nightly 90 tablet 1    diclofenac (VOLTAREN) 75 MG EC tablet Take 1 tablet by mouth 2 times daily (Patient not taking: Reported on 5/14/2025)      methocarbamol (ROBAXIN) 750 MG tablet Take by mouth 1 tab at bedtime prn (Patient not taking: Reported on 5/14/2025)      nystatin (MYCOSTATIN) 270375 UNIT/GM powder Apply topically 4 times daily (Patient not taking: Reported on 5/14/2025)      rOPINIRole (REQUIP) 2 MG tablet TAKE 1 TABLET BY MOUTH EVERY NIGHT AT BEDTIME FOR RESTLESS LEGS (Patient not taking: Reported on 5/14/2025)       No current facility-administered medications for this visit.     Past Surgical History:   Procedure Laterality Date    HX PARTIAL COLECTOMY      XR MIDLINE EQUAL OR GREATER THAN 5 YEARS  5/6/2022    XR MIDLINE EQUAL OR GREATER THAN 5 YEARS 5/6/2022         Lab Results   Component Value Date    WBC 6.4 05/06/2025    HGB 14.4 05/06/2025    HCT 40.7 05/06/2025    MCV 99.3 (H) 05/06/2025     05/06/2025     Lab Results   Component Value Date    CHOL 105 05/06/2025    TRIG 128 05/06/2025    HDL 58 05/06/2025     Lab Results   Component Value Date    CREATININE 0.88 05/14/2025    BUN 10 05/14/2025     05/14/2025    K

## 2025-05-29 ENCOUNTER — HOSPITAL ENCOUNTER (OUTPATIENT)
Facility: HOSPITAL | Age: 75
Discharge: HOME | End: 2025-05-29
Attending: INTERNAL MEDICINE
Payer: MEDICARE

## 2025-05-29 DIAGNOSIS — R79.89 LFT ELEVATION: ICD-10-CM

## 2025-05-29 PROCEDURE — A9575 INJ GADOTERATE MEGLUMI 0.1ML: HCPCS | Performed by: RADIOLOGY

## 2025-05-29 PROCEDURE — 74183 MRI ABD W/O CNTR FLWD CNTR: CPT

## 2025-05-29 PROCEDURE — 6360000004 HC RX CONTRAST MEDICATION: Performed by: RADIOLOGY

## 2025-05-29 RX ORDER — GADOTERATE MEGLUMINE 376.9 MG/ML
17 INJECTION INTRAVENOUS
Status: COMPLETED | OUTPATIENT
Start: 2025-05-29 | End: 2025-05-29

## 2025-05-29 RX ADMIN — GADOTERATE MEGLUMINE 17 ML: 376.9 INJECTION INTRAVENOUS at 16:39

## 2025-05-30 ENCOUNTER — RESULTS FOLLOW-UP (OUTPATIENT)
Age: 75
End: 2025-05-30

## 2025-05-30 NOTE — TELEPHONE ENCOUNTER
Faviola states returning call to clinical staff in regards to lab results.      Please call to discuss.

## 2025-06-04 ENCOUNTER — OFFICE VISIT (OUTPATIENT)
Age: 75
End: 2025-06-04
Payer: MEDICARE

## 2025-06-04 ENCOUNTER — TELEPHONE (OUTPATIENT)
Age: 75
End: 2025-06-04

## 2025-06-04 VITALS
TEMPERATURE: 97 F | DIASTOLIC BLOOD PRESSURE: 80 MMHG | HEIGHT: 66 IN | SYSTOLIC BLOOD PRESSURE: 149 MMHG | BODY MASS INDEX: 29.79 KG/M2 | WEIGHT: 185.38 LBS | OXYGEN SATURATION: 98 % | HEART RATE: 68 BPM

## 2025-06-04 DIAGNOSIS — R79.89 LFT ELEVATION: ICD-10-CM

## 2025-06-04 DIAGNOSIS — I10 PRIMARY HYPERTENSION: ICD-10-CM

## 2025-06-04 DIAGNOSIS — Z85.038 H/O MALIGNANT NEOPLASM OF COLON: ICD-10-CM

## 2025-06-04 DIAGNOSIS — H83.01 OTITIS INTERNA, RIGHT: Primary | ICD-10-CM

## 2025-06-04 DIAGNOSIS — H93.8X3 SENSATION OF FULLNESS IN BOTH EARS: ICD-10-CM

## 2025-06-04 PROCEDURE — 1159F MED LIST DOCD IN RCRD: CPT | Performed by: INTERNAL MEDICINE

## 2025-06-04 PROCEDURE — 3077F SYST BP >= 140 MM HG: CPT | Performed by: INTERNAL MEDICINE

## 2025-06-04 PROCEDURE — 3079F DIAST BP 80-89 MM HG: CPT | Performed by: INTERNAL MEDICINE

## 2025-06-04 PROCEDURE — 99214 OFFICE O/P EST MOD 30 MIN: CPT | Performed by: INTERNAL MEDICINE

## 2025-06-04 PROCEDURE — 1123F ACP DISCUSS/DSCN MKR DOCD: CPT | Performed by: INTERNAL MEDICINE

## 2025-06-04 RX ORDER — LOSARTAN POTASSIUM 100 MG/1
100 TABLET ORAL DAILY
Qty: 90 TABLET | Refills: 1 | Status: SHIPPED | OUTPATIENT
Start: 2025-06-04

## 2025-06-04 RX ORDER — CIPROFLOXACIN AND DEXAMETHASONE 3; 1 MG/ML; MG/ML
4 SUSPENSION/ DROPS AURICULAR (OTIC) 2 TIMES DAILY
Qty: 7.5 ML | Refills: 0 | Status: SHIPPED | OUTPATIENT
Start: 2025-06-04 | End: 2025-06-11

## 2025-06-04 ASSESSMENT — PATIENT HEALTH QUESTIONNAIRE - PHQ9
SUM OF ALL RESPONSES TO PHQ QUESTIONS 1-9: 0
1. LITTLE INTEREST OR PLEASURE IN DOING THINGS: NOT AT ALL
2. FEELING DOWN, DEPRESSED OR HOPELESS: NOT AT ALL

## 2025-06-04 ASSESSMENT — ENCOUNTER SYMPTOMS: SHORTNESS OF BREATH: 0

## 2025-06-04 NOTE — TELEPHONE ENCOUNTER
Lvm for annette to call back  We need to know if pt took his losartan today. If he did then will increase to 100mg daily

## 2025-06-04 NOTE — TELEPHONE ENCOUNTER
Received transferred call from psr  Spoke with Pt daughter Faviola/WADE  Received two pt identifiers    Faviola states her father said he did take his medication    She is aware Dr. Zuniga will be sending in increased dosage of Losartan 100 mg.     Pt daughter verbalizes understanding of the instructions and has no further questions at this time.

## 2025-06-04 NOTE — PROGRESS NOTES
HISTORY OF PRESENT ILLNESS  Ervin Jones is a 74 y.o. male.    Lov 5/14/25. Patient presents today for acute care.      BP: 152/84 will repeat started him on losartan 50 mg daily last office visit no home readings to review  He has been taking it every day and he took it this morning blood pressure remains elevated will increase to 100 mg    Pt has fullness sensation in both ears x 1 month. He denies fever, chills, and associated sx. He was told to not use Q tips in his ear and avoid hearing aids for now. Referred to ENT and rx'd ear drops and antibiotic.    Pt and his daughter were reminded of a liver doctor appointment 6/10/25, had significantly elevated LFTs on labs emphasized the importance of not missing the appointment. Pt has consumed no alcohol since last visit 5/14/25.  With elevated liver tests I message hepatology we ended up getting an MRI of the abdomen reviewed unremarkable discussed this with patient liver clinic appointment is pending    GI appt scheduled 6/9/25.  Please recall he has a history of colon cancer and is significantly overdue to have this reevaluated    Reviewed labs        Patient Active Problem List   Diagnosis    RLS (restless legs syndrome)    Gastroesophageal reflux disease without esophagitis    Memory impairment    Chronic bilateral low back pain without sciatica    H/O malignant neoplasm of colon    Idiopathic chronic gout of multiple sites without tophus    LFT elevation     Current Outpatient Medications   Medication Sig Dispense Refill    amoxicillin-clavulanate (AUGMENTIN) 875-125 MG per tablet Take 1 tablet by mouth 2 times daily for 7 days 14 tablet 0    ciprofloxacin-dexAMETHasone (CIPRODEX) 0.3-0.1 % otic suspension Place 4 drops into the right ear 2 times daily for 7 days 7.5 mL 0    losartan (COZAAR) 50 MG tablet Take 1 tablet by mouth daily 90 tablet 0    pantoprazole (PROTONIX) 40 MG tablet Take 1 tablet by mouth daily 90 tablet 1    allopurinol (ZYLOPRIM) 100 MG

## 2025-06-10 ENCOUNTER — OFFICE VISIT (OUTPATIENT)
Age: 75
End: 2025-06-10
Payer: MEDICARE

## 2025-06-10 VITALS
TEMPERATURE: 98.1 F | HEART RATE: 81 BPM | SYSTOLIC BLOOD PRESSURE: 139 MMHG | BODY MASS INDEX: 29.41 KG/M2 | WEIGHT: 183 LBS | HEIGHT: 66 IN | DIASTOLIC BLOOD PRESSURE: 86 MMHG | OXYGEN SATURATION: 98 %

## 2025-06-10 DIAGNOSIS — K75.81 METABOLIC DYSFUNCTION-ASSOCIATED STEATOHEPATITIS (MASH): Primary | ICD-10-CM

## 2025-06-10 PROCEDURE — 99204 OFFICE O/P NEW MOD 45 MIN: CPT | Performed by: INTERNAL MEDICINE

## 2025-06-10 PROCEDURE — 91200 LIVER ELASTOGRAPHY: CPT | Performed by: INTERNAL MEDICINE

## 2025-06-10 RX ORDER — LATANOPROST 50 UG/ML
SOLUTION/ DROPS OPHTHALMIC
COMMUNITY
Start: 2025-03-26

## 2025-06-10 ASSESSMENT — PATIENT HEALTH QUESTIONNAIRE - PHQ9
DEPRESSION UNABLE TO ASSESS: FUNCTIONAL CAPACITY MOTIVATION LIMITS ACCURACY
SUM OF ALL RESPONSES TO PHQ QUESTIONS 1-9: 0
1. LITTLE INTEREST OR PLEASURE IN DOING THINGS: NOT AT ALL
2. FEELING DOWN, DEPRESSED OR HOPELESS: NOT AT ALL

## 2025-06-10 NOTE — PROGRESS NOTES
Chief Complaint   Patient presents with    New Patient     New PT   Records are in EPIC     Vitals:    06/10/25 1430   BP: (!) 154/91   BP Site: Left Upper Arm   Patient Position: Sitting   Pulse: 78   Temp: 98.1 °F (36.7 °C)   TempSrc: Temporal   SpO2: 98%   Weight: 83 kg (183 lb)   Height: 1.676 m (5' 6\")     .  \"Have you been to the ER, urgent care clinic since your last visit?  Hospitalized since your last visit?\"    NO    “Have you seen or consulted any other health care providers outside of [facility] since your last visit?”    NO          Click Here for Release of Records Request

## 2025-06-10 NOTE — PROGRESS NOTES
[facility]     Michael Fox MD, FACP, MACG, FAASLD   MD Marian Dillard PA-C April S Ashworth, [facility]   Vanessa Rashidkirti, [facility]  Gurpreet Alvarez, FNP-C   Erin Robert, [facility]         [facility] [place] [facility]   [address], [address]   [place]  [address] [phone]   FAX: [phone]  [facility] [place] [facility]   [address], [address]   [place]  [address] [phone]   FAX: [phone]       Patient Care Team:  Savita Zuniga MD as PCP - General  Savita Zuniga MD as PCP - Empaneled Provider      Patient Active Problem List   Diagnosis    RLS (restless legs syndrome)    GERD (gastroesophageal reflux disease)    Memory impairment    Chronic bilateral low back pain without sciatica    H/O malignant neoplasm of colon    Gout    Elevated liver enzymes    Primary hypertension       The clinicians listed above have asked me to see Ervin Jones in consultation regarding elevated liver enzymes and its management.      All medical records sent by the referring physicians were reviewed including imaging studies     The patient is a 74 y.o. male who was found to have elevated liver transaminases and alkaline phosphatase in 3/2023.      Serologic evaluation for markers of chronic liver disease was negative for HCV, HBV,     The most recent imaging of the liver was Ultrasound and MRI performed in 3/2023 and 5/2025.  Results suggest steatotic liver disease (SLD).      Assessment of liver fibrosis with Fibroscan was performed in the office today.  The result was 14.2 kPa which correlates with stage 3 fibrosis.  The CAP score of 333 suggests there Is hepatic steatosis.    In the office today the patient has the following symptoms:  fatigue,     The patient is not experiencing the following symptoms

## 2025-06-11 LAB
A1AT SERPL-MCNC: 152 MG/DL (ref 101–187)
CERULOPLASMIN SERPL-MCNC: 24.4 MG/DL (ref 16–31)
FERRITIN SERPL-MCNC: 336 NG/ML (ref 30–400)
INR PPP: 1 (ref 0.9–1.2)
IRON SATN MFR SERPL: 30 % (ref 15–55)
IRON SERPL-MCNC: 99 UG/DL (ref 38–169)
PROTHROMBIN TIME: 11.4 SEC (ref 9.1–12)
TIBC SERPL-MCNC: 328 UG/DL (ref 250–450)
UIBC SERPL-MCNC: 229 UG/DL (ref 111–343)

## 2025-06-13 LAB — SMA IGG SER-ACNC: 4 UNITS (ref 0–19)

## 2025-06-15 LAB
ANA SER QL IF: POSITIVE
ANA SPECKLED TITR SER: ABNORMAL {TITER}
LABORATORY COMMENT REPORT: ABNORMAL

## 2025-06-18 DIAGNOSIS — G89.29 CHRONIC BILATERAL LOW BACK PAIN WITHOUT SCIATICA: Primary | ICD-10-CM

## 2025-06-18 DIAGNOSIS — M54.50 CHRONIC BILATERAL LOW BACK PAIN WITHOUT SCIATICA: Primary | ICD-10-CM

## 2025-06-20 LAB
PETH BLD QL SCN: POSITIVE
PETH BLD-MCNC: 109 NG/ML

## 2025-06-27 ENCOUNTER — TELEPHONE (OUTPATIENT)
Age: 75
End: 2025-06-27

## 2025-06-27 PROBLEM — K75.81 METABOLIC DYSFUNCTION-ASSOCIATED STEATOHEPATITIS (MASH): Status: ACTIVE | Noted: 2025-06-27

## 2025-06-27 PROBLEM — R74.8 ELEVATED LIVER ENZYMES: Status: ACTIVE | Noted: 2025-06-04

## 2025-06-27 PROBLEM — M10.9 GOUT: Status: ACTIVE | Noted: 2025-03-14

## 2025-06-27 PROBLEM — K75.81 METABOLIC DYSFUNCTION-ASSOCIATED STEATOHEPATITIS (MASH): Status: RESOLVED | Noted: 2025-06-27 | Resolved: 2025-06-27

## 2025-06-27 PROBLEM — K21.9 GERD (GASTROESOPHAGEAL REFLUX DISEASE): Status: ACTIVE | Noted: 2023-03-03

## 2025-06-27 NOTE — TELEPHONE ENCOUNTER
----- Message from Dr. Michael Fox MD sent at 6/27/2025  3:13 AM EDT -----  Regarding: Liver enzymes remain elevated.  All tests negative.  PETH test which can detect any alchool use in the past 6-8 weeks was positive with a value suggesting daily alcohol use.  You had told me in the office that alcohol use was limited to only social occasions.      Suggest no alcohol until next office appt.  IF liver enzymes are still elevated with no alcohol use then liver biopsy will be necessary to figure out why liver enzymes and confirm how much damage there is    MLS  .    6/27/25@4840 Spoke w/ patient daughter. Above message from  relayed to patient daughter and she verbalize understanding. Patient has not drink alcohol in 2 weeks. (KF)

## 2025-08-05 DIAGNOSIS — G25.81 RLS (RESTLESS LEGS SYNDROME): Primary | ICD-10-CM

## 2025-08-06 RX ORDER — GABAPENTIN 100 MG/1
100 CAPSULE ORAL NIGHTLY
Qty: 30 CAPSULE | Refills: 0 | Status: SHIPPED | OUTPATIENT
Start: 2025-08-06 | End: 2025-09-05